# Patient Record
Sex: FEMALE | Race: BLACK OR AFRICAN AMERICAN | NOT HISPANIC OR LATINO | Employment: OTHER | URBAN - METROPOLITAN AREA
[De-identification: names, ages, dates, MRNs, and addresses within clinical notes are randomized per-mention and may not be internally consistent; named-entity substitution may affect disease eponyms.]

---

## 2023-08-07 ENCOUNTER — HOSPITAL ENCOUNTER (INPATIENT)
Facility: HOSPITAL | Age: 88
LOS: 2 days | Discharge: HOSPICE | DRG: 054 | End: 2023-08-09
Attending: EMERGENCY MEDICINE | Admitting: INTERNAL MEDICINE
Payer: MEDICARE

## 2023-08-07 DIAGNOSIS — I63.9 STROKE: ICD-10-CM

## 2023-08-07 DIAGNOSIS — I10 HYPERTENSION, UNSPECIFIED TYPE: ICD-10-CM

## 2023-08-07 DIAGNOSIS — I61.2 NONTRAUMATIC HEMORRHAGE OF RIGHT CEREBRAL HEMISPHERE: Primary | ICD-10-CM

## 2023-08-07 DIAGNOSIS — R90.0 INTRACRANIAL MASS: ICD-10-CM

## 2023-08-07 DIAGNOSIS — I61.1 NONTRAUMATIC CORTICAL HEMORRHAGE OF RIGHT CEREBRAL HEMISPHERE: ICD-10-CM

## 2023-08-07 DIAGNOSIS — I61.9 INTRACEREBRAL HEMORRHAGE: ICD-10-CM

## 2023-08-07 LAB
ALBUMIN SERPL-MCNC: 3.8 G/DL (ref 3.4–4.8)
ALBUMIN/GLOB SERPL: 1.5 RATIO (ref 1.1–2)
ALP SERPL-CCNC: 143 UNIT/L (ref 40–150)
ALT SERPL-CCNC: 11 UNIT/L (ref 0–55)
APTT PPP: 39.1 SECONDS (ref 23.2–33.7)
AST SERPL-CCNC: 19 UNIT/L (ref 5–34)
AV INDEX (PROSTH): 0.65
AV MEAN GRADIENT: 6 MMHG
AV PEAK GRADIENT: 10 MMHG
AV VALVE AREA BY VELOCITY RATIO: 2.19 CM²
AV VALVE AREA: 2.26 CM²
AV VELOCITY RATIO: 0.63
BASOPHILS # BLD AUTO: 0.01 X10(3)/MCL
BASOPHILS NFR BLD AUTO: 0.2 %
BILIRUBIN DIRECT+TOT PNL SERPL-MCNC: 1 MG/DL
BSA FOR ECHO PROCEDURE: 1.83 M2
BUN SERPL-MCNC: 12 MG/DL (ref 9.8–20.1)
CALCIUM SERPL-MCNC: 10.8 MG/DL (ref 8.4–10.2)
CHLORIDE SERPL-SCNC: 105 MMOL/L (ref 98–111)
CHOLEST SERPL-MCNC: 174 MG/DL
CHOLEST/HDLC SERPL: 3 {RATIO} (ref 0–5)
CO2 SERPL-SCNC: 30 MMOL/L (ref 23–31)
CREAT SERPL-MCNC: 0.91 MG/DL (ref 0.55–1.02)
CV ECHO LV RWT: 0.46 CM
DOP CALC AO PEAK VEL: 1.58 M/S
DOP CALC AO VTI: 36.3 CM
DOP CALC LVOT AREA: 3.5 CM2
DOP CALC LVOT DIAMETER: 2.1 CM
DOP CALC LVOT PEAK VEL: 1 M/S
DOP CALC LVOT STROKE VOLUME: 82.05 CM3
DOP CALC MV VTI: 37.9 CM
DOP CALCLVOT PEAK VEL VTI: 23.7 CM
E WAVE DECELERATION TIME: 232 MSEC
E/A RATIO: 0.88
E/E' RATIO: 18.67 M/S
ECHO LV POSTERIOR WALL: 1.11 CM (ref 0.6–1.1)
EOSINOPHIL # BLD AUTO: 0.04 X10(3)/MCL (ref 0–0.9)
EOSINOPHIL NFR BLD AUTO: 0.8 %
ERYTHROCYTE [DISTWIDTH] IN BLOOD BY AUTOMATED COUNT: 12.8 % (ref 11.5–17)
EST. AVERAGE GLUCOSE BLD GHB EST-MCNC: 111.2 MG/DL
FRACTIONAL SHORTENING: 30 % (ref 28–44)
GFR SERPLBLD CREATININE-BSD FMLA CKD-EPI: 59 MLS/MIN/1.73/M2
GLOBULIN SER-MCNC: 2.5 GM/DL (ref 2.4–3.5)
GLUCOSE SERPL-MCNC: 84 MG/DL (ref 75–121)
HBA1C MFR BLD: 5.5 %
HCT VFR BLD AUTO: 38 % (ref 37–47)
HDLC SERPL-MCNC: 51 MG/DL (ref 35–60)
HGB BLD-MCNC: 12.9 G/DL (ref 12–16)
IMM GRANULOCYTES # BLD AUTO: 0.01 X10(3)/MCL (ref 0–0.04)
IMM GRANULOCYTES NFR BLD AUTO: 0.2 %
INR PPP: 1.1
INTERVENTRICULAR SEPTUM: 0.82 CM (ref 0.6–1.1)
LDLC SERPL CALC-MCNC: 113 MG/DL (ref 50–140)
LEFT ATRIUM SIZE: 3 CM
LEFT ATRIUM VOLUME INDEX MOD: 50.1 ML/M2
LEFT ATRIUM VOLUME MOD: 90.7 CM3
LEFT INTERNAL DIMENSION IN SYSTOLE: 3.37 CM (ref 2.1–4)
LEFT VENTRICLE DIASTOLIC VOLUME INDEX: 59.12 ML/M2
LEFT VENTRICLE DIASTOLIC VOLUME: 107 ML
LEFT VENTRICLE MASS INDEX: 89 G/M2
LEFT VENTRICLE SYSTOLIC VOLUME INDEX: 25.6 ML/M2
LEFT VENTRICLE SYSTOLIC VOLUME: 46.4 ML
LEFT VENTRICULAR INTERNAL DIMENSION IN DIASTOLE: 4.79 CM (ref 3.5–6)
LEFT VENTRICULAR MASS: 161.64 G
LV LATERAL E/E' RATIO: 16.8 M/S
LV SEPTAL E/E' RATIO: 21 M/S
LVOT MG: 2 MMHG
LVOT MV: 0.64 CM/S
LYMPHOCYTES # BLD AUTO: 1.08 X10(3)/MCL (ref 0.6–4.6)
LYMPHOCYTES NFR BLD AUTO: 22.7 %
MCH RBC QN AUTO: 31.1 PG (ref 27–31)
MCHC RBC AUTO-ENTMCNC: 33.9 G/DL (ref 33–36)
MCV RBC AUTO: 91.6 FL (ref 80–94)
MONOCYTES # BLD AUTO: 0.42 X10(3)/MCL (ref 0.1–1.3)
MONOCYTES NFR BLD AUTO: 8.8 %
MV MEAN GRADIENT: 2 MMHG
MV PEAK A VEL: 0.96 M/S
MV PEAK E VEL: 0.84 M/S
MV PEAK GRADIENT: 5 MMHG
MV STENOSIS PRESSURE HALF TIME: 96 MS
MV VALVE AREA BY CONTINUITY EQUATION: 2.16 CM2
MV VALVE AREA P 1/2 METHOD: 2.29 CM2
NEUTROPHILS # BLD AUTO: 3.19 X10(3)/MCL (ref 2.1–9.2)
NEUTROPHILS NFR BLD AUTO: 67.3 %
NRBC BLD AUTO-RTO: 0 %
OHS LV EJECTION FRACTION SIMPSONS BIPLANE MOD: 6 %
PISA TR MAX VEL: 2.5 M/S
PLATELET # BLD AUTO: 190 X10(3)/MCL (ref 130–400)
PMV BLD AUTO: 9.9 FL (ref 7.4–10.4)
POTASSIUM SERPL-SCNC: 3.7 MMOL/L (ref 3.5–5.1)
PROT SERPL-MCNC: 6.3 GM/DL (ref 5.8–7.6)
PROTHROMBIN TIME: 14.1 SECONDS (ref 12.5–14.5)
PV PEAK GRADIENT: 5 MMHG
PV PEAK VELOCITY: 1.13 M/S
RA PRESSURE ESTIMATED: 3 MMHG
RBC # BLD AUTO: 4.15 X10(6)/MCL (ref 4.2–5.4)
RV TB RVSP: 6 MMHG
SODIUM SERPL-SCNC: 142 MMOL/L (ref 132–146)
TDI LATERAL: 0.05 M/S
TDI SEPTAL: 0.04 M/S
TDI: 0.05 M/S
TR MAX PG: 25 MMHG
TRICUSPID ANNULAR PLANE SYSTOLIC EXCURSION: 1.95 CM
TRIGL SERPL-MCNC: 48 MG/DL (ref 37–140)
TSH SERPL-ACNC: 1.75 UIU/ML (ref 0.35–4.94)
TV REST PULMONARY ARTERY PRESSURE: 28 MMHG
VLDLC SERPL CALC-MCNC: 10 MG/DL
WBC # SPEC AUTO: 4.75 X10(3)/MCL (ref 4.5–11.5)
Z-SCORE OF LEFT VENTRICULAR DIMENSION IN END DIASTOLE: -0.44
Z-SCORE OF LEFT VENTRICULAR DIMENSION IN END SYSTOLE: 0.68

## 2023-08-07 PROCEDURE — 25000003 PHARM REV CODE 250

## 2023-08-07 PROCEDURE — 84443 ASSAY THYROID STIM HORMONE: CPT | Performed by: NURSE PRACTITIONER

## 2023-08-07 PROCEDURE — 99222 PR INITIAL HOSPITAL CARE,LEVL II: ICD-10-PCS | Mod: ,,, | Performed by: SPECIALIST

## 2023-08-07 PROCEDURE — 99291 CRITICAL CARE FIRST HOUR: CPT

## 2023-08-07 PROCEDURE — 20000000 HC ICU ROOM

## 2023-08-07 PROCEDURE — 63600175 PHARM REV CODE 636 W HCPCS

## 2023-08-07 PROCEDURE — 99222 1ST HOSP IP/OBS MODERATE 55: CPT | Mod: ,,, | Performed by: SPECIALIST

## 2023-08-07 PROCEDURE — 85025 COMPLETE CBC W/AUTO DIFF WBC: CPT | Performed by: EMERGENCY MEDICINE

## 2023-08-07 PROCEDURE — 63600175 PHARM REV CODE 636 W HCPCS: Performed by: SPECIALIST

## 2023-08-07 PROCEDURE — 63600175 PHARM REV CODE 636 W HCPCS: Performed by: NURSE PRACTITIONER

## 2023-08-07 PROCEDURE — 85730 THROMBOPLASTIN TIME PARTIAL: CPT | Performed by: EMERGENCY MEDICINE

## 2023-08-07 PROCEDURE — 96374 THER/PROPH/DIAG INJ IV PUSH: CPT

## 2023-08-07 PROCEDURE — 80053 COMPREHEN METABOLIC PANEL: CPT | Performed by: EMERGENCY MEDICINE

## 2023-08-07 PROCEDURE — 83036 HEMOGLOBIN GLYCOSYLATED A1C: CPT | Performed by: NURSE PRACTITIONER

## 2023-08-07 PROCEDURE — 85610 PROTHROMBIN TIME: CPT | Performed by: EMERGENCY MEDICINE

## 2023-08-07 PROCEDURE — 80061 LIPID PANEL: CPT | Performed by: NURSE PRACTITIONER

## 2023-08-07 PROCEDURE — 25000003 PHARM REV CODE 250: Performed by: INTERNAL MEDICINE

## 2023-08-07 RX ORDER — HYDRALAZINE HYDROCHLORIDE 20 MG/ML
10 INJECTION INTRAMUSCULAR; INTRAVENOUS EVERY 4 HOURS PRN
Status: DISCONTINUED | OUTPATIENT
Start: 2023-08-07 | End: 2023-08-07

## 2023-08-07 RX ORDER — LABETALOL HYDROCHLORIDE 5 MG/ML
10 INJECTION, SOLUTION INTRAVENOUS
Status: DISCONTINUED | OUTPATIENT
Start: 2023-08-07 | End: 2023-08-08

## 2023-08-07 RX ORDER — MECLIZINE HYDROCHLORIDE 50 MG/1
25 TABLET ORAL 3 TIMES DAILY PRN
COMMUNITY

## 2023-08-07 RX ORDER — HYDRALAZINE HYDROCHLORIDE 20 MG/ML
10 INJECTION INTRAMUSCULAR; INTRAVENOUS EVERY 4 HOURS PRN
Status: DISCONTINUED | OUTPATIENT
Start: 2023-08-07 | End: 2023-08-09 | Stop reason: HOSPADM

## 2023-08-07 RX ORDER — HYDRALAZINE HYDROCHLORIDE 20 MG/ML
10 INJECTION INTRAMUSCULAR; INTRAVENOUS EVERY 6 HOURS PRN
Status: DISCONTINUED | OUTPATIENT
Start: 2023-08-07 | End: 2023-08-07

## 2023-08-07 RX ORDER — LABETALOL HYDROCHLORIDE 5 MG/ML
10 INJECTION, SOLUTION INTRAVENOUS
Status: DISCONTINUED | OUTPATIENT
Start: 2023-08-07 | End: 2023-08-07

## 2023-08-07 RX ORDER — MULTIVITAMIN
1 TABLET ORAL DAILY
COMMUNITY

## 2023-08-07 RX ORDER — LEVOCETIRIZINE DIHYDROCHLORIDE 5 MG/1
5 TABLET, FILM COATED ORAL NIGHTLY
COMMUNITY

## 2023-08-07 RX ORDER — LABETALOL HYDROCHLORIDE 5 MG/ML
10 INJECTION, SOLUTION INTRAVENOUS EVERY 6 HOURS PRN
Status: DISCONTINUED | OUTPATIENT
Start: 2023-08-07 | End: 2023-08-09 | Stop reason: HOSPADM

## 2023-08-07 RX ORDER — SODIUM CHLORIDE 0.9 % (FLUSH) 0.9 %
10 SYRINGE (ML) INJECTION
Status: DISCONTINUED | OUTPATIENT
Start: 2023-08-07 | End: 2023-08-09 | Stop reason: HOSPADM

## 2023-08-07 RX ORDER — HYDRALAZINE HYDROCHLORIDE 20 MG/ML
INJECTION INTRAMUSCULAR; INTRAVENOUS
Status: DISPENSED
Start: 2023-08-07 | End: 2023-08-08

## 2023-08-07 RX ORDER — DILTIAZEM HYDROCHLORIDE 360 MG/1
360 CAPSULE, EXTENDED RELEASE ORAL DAILY
COMMUNITY

## 2023-08-07 RX ORDER — MUPIROCIN 20 MG/G
OINTMENT TOPICAL 2 TIMES DAILY
Status: DISCONTINUED | OUTPATIENT
Start: 2023-08-07 | End: 2023-08-09 | Stop reason: HOSPADM

## 2023-08-07 RX ORDER — LEVETIRACETAM 500 MG/1
500 TABLET ORAL 2 TIMES DAILY
Status: DISCONTINUED | OUTPATIENT
Start: 2023-08-07 | End: 2023-08-09 | Stop reason: HOSPADM

## 2023-08-07 RX ORDER — PRAVASTATIN SODIUM 40 MG/1
40 TABLET ORAL DAILY
COMMUNITY

## 2023-08-07 RX ADMIN — HYDRALAZINE HYDROCHLORIDE 10 MG: 20 INJECTION INTRAMUSCULAR; INTRAVENOUS at 04:08

## 2023-08-07 RX ADMIN — LABETALOL HYDROCHLORIDE 10 MG: 5 INJECTION, SOLUTION INTRAVENOUS at 02:08

## 2023-08-07 RX ADMIN — LABETALOL HYDROCHLORIDE 10 MG: 5 INJECTION, SOLUTION INTRAVENOUS at 08:08

## 2023-08-07 RX ADMIN — LEVETIRACETAM 500 MG: 500 TABLET, FILM COATED ORAL at 09:08

## 2023-08-07 RX ADMIN — MUPIROCIN: 20 OINTMENT TOPICAL at 09:08

## 2023-08-07 NOTE — H&P
[facility] - Emergency Dept  Pulmonary Critical Care Note    Patient Name: Kristel Mayes  MRN: [id number]  Admission Date: 8/7/2023  Hospital Length of Stay: 0 days  Code Status: No Order  Attending Provider: Xavier Johns MD  Primary Care Provider: No primary care provider on file.     Subjective:     HPI:   89-year-old female with past medical history of HTN and A fib on Eliqiuis presented to [facility] ER as a transfer from [facility] after presenting with left-sided weakness and left sided facial droop that began about 2-3 days prior accompanied by generalized weakness and dizziness. Patient and patient's family denies any fall or injury.   At [facility], patient was found to have a right frontoparietal intracranial hemorrhage with mild midline shift on head CT. Per report from the ER, she was given K centra prior to transfer for reversal of anticoagulation and had an NIH of 1.    In the ER, patient was seen by neurology who ordered head CT and started on labetalol for strict BP control. BP was 138/88 with a HR of 62 and RR of 17. She was afebrile and saturating 95% on room air.    Hospital Course/Significant events:  As above    24 Hour Interval History:  As above    No past medical history on file.  A fib  Hypertension    No past surgical history on file.  I&D last year for perirectal abscess    Social History     Socioeconomic History    Marital status:            Current Outpatient Medications   Medication Instructions    apixaban (ELIQUIS) 2.5 mg Tab Oral, 2 times daily    diltiaZEM (CARDIZEM CD) 360 mg, Oral, Daily    levocetirizine (XYZAL) 5 mg, Oral, Nightly    meclizine (ANTIVERT) 25 mg, Oral, 3 times daily PRN    multivitamin (THERAGRAN) per tablet 1 tablet, Oral, Daily    pravastatin (PRAVACHOL) 40 mg, Oral, Daily       Current Inpatient Medications      Current Intravenous Infusions        Review of Systems   Constitutional:  Negative for chills and  fever.   HENT:  Negative for congestion.    Eyes:  Negative for pain.   Respiratory:  Negative for cough.    Cardiovascular:  Negative for chest pain.   Gastrointestinal:  Negative for abdominal pain, constipation, diarrhea, nausea and vomiting.   Genitourinary:  Negative for dysuria.   Musculoskeletal:  Negative for falls.   Neurological:  Negative for tingling, focal weakness, seizures, loss of consciousness, weakness and headaches.          Objective:     No intake or output data in the 24 hours ending 08/07/23 1454      Vital Signs (Most Recent):  Temp: 98.8 °F (37.1 °C) (08/07/23 1350)  Pulse: 83 (08/07/23 1409)  Resp: (!) 21 (08/07/23 1409)  BP: (!) 151/78 (08/07/23 1409)  SpO2: 97 % (08/07/23 1409)  Body mass index is 25.62 kg/m².  Weight: 72 kg (158 lb 11.7 oz) Vital Signs (24h Range):  Temp:  [98.8 °F (37.1 °C)] 98.8 °F (37.1 °C)  Pulse:  [80-83] 83  Resp:  [21-22] 21  SpO2:  [97 %] 97 %  BP: (151-157)/(78-92) 151/78     Physical Exam  Constitutional:       General: She is not in acute distress.     Comments: Answering questions appropriately and cooperative with exam.   HENT:      Mouth/Throat:      Mouth: Mucous membranes are moist.   Eyes:      Extraocular Movements: Extraocular movements intact.      Pupils: Pupils are equal, round, and reactive to light.   Cardiovascular:      Rate and Rhythm: Normal rate and regular rhythm.      Heart sounds: Normal heart sounds. No murmur heard.     Comments: 2+ pedal and radial pulses B/L  Pulmonary:      Effort: Pulmonary effort is normal. No respiratory distress.      Breath sounds: Normal breath sounds. No wheezing.      Comments: Breathing comfortably on RA  Abdominal:      General: Abdomen is flat. There is no distension.      Palpations: Abdomen is soft. There is no mass.      Tenderness: There is no abdominal tenderness. There is no right CVA tenderness, left CVA tenderness, guarding or rebound.   Musculoskeletal:         General: Swelling (B/L lower  "extreities) present.      Right lower leg: No edema.      Left lower leg: No edema.      Comments: Able to lift B/L legs and arms off bed against resistance.  5/5  strength B/L   Skin:     General: Skin is warm.      Capillary Refill: Capillary refill takes less than 2 seconds.   Neurological:      Mental Status: She is alert and oriented to person, place, and time.      Comments: Sensation to light touch intact bilaterally. No facial droop noted           Lines/Drains/Airways       Peripheral Intravenous Line  Duration                  Peripheral IV - Single Lumen 08/07/23 1402 22 G Right Antecubital <1 day         Peripheral IV - Single Lumen 08/07/23 Left Antecubital <1 day                    Significant Labs:    Lab Results   Component Value Date    WBC 4.75 08/07/2023    HGB 12.9 08/07/2023    HCT 38.0 08/07/2023    MCV 91.6 08/07/2023     08/07/2023           BMP  Lab Results   Component Value Date     08/07/2023    K 3.7 08/07/2023    CHLORIDE 105 08/07/2023    CO2 30 08/07/2023    BUN 12.0 08/07/2023    CREATININE 0.91 08/07/2023    CALCIUM 10.8 (H) 08/07/2023         ABG  No results for input(s): "PH", "PO2", "PCO2", "HCO3", "POCBASEDEF" in the last 168 hours.    Mechanical Ventilation Support: none         Significant Imaging:  I have reviewed the pertinent imaging within the past 24 hours.        Assessment/Plan:     Assessment  Right frontoparietal intracranial hemorrhage with mild midline shift  Hypertension  Atrial fibrillation    Plan  Maintain blood pressure less than 130-150/90.  MRI brain final read pending  Q1 hr neuro checks; will avoid antiplatelet and anticoagulation at this time and elevate the head of the bed per Neurology recs  PT/OT/ST to evaluate patient.  Repeat CT head in the morning  Keppra for seizure prophylaxis   Labetalol and hydralazine for blood pressure control.  We will hold home diltiazem 360 mg at this time.   Neurology and Neurosurgery have been consulted; " all recs are appreciated.          DVT Prophylaxis: SCDs  GI Prophylaxis: none           32 minutes of critical care was time spent personally by me on the following activities: development of treatment plan with patient or surrogate and bedside caregivers, discussions with consultants, evaluation of patient's response to treatment, examination of patient, ordering and performing treatments and interventions, ordering and review of laboratory studies, ordering and review of radiographic studies, pulse oximetry, re-evaluation of patient's condition.  This critical care time did not overlap with that of any other provider or involve time for any procedures.           Shannan Ac, DO  Pulmonary Critical Care Medicine  [facility] - Emergency Dept

## 2023-08-07 NOTE — H&P
I saw patient shortly after her arrival in the ICU.  Family at the bedside.  Patient with several days of weakness specifically left-sided in nature.  CT frontoparietal intracranial hemorrhage ICU for blood pressure control and frequent neuro checks.  Patient taking Eliquis at home for chronic atrial fib Kcentra given in the seen by Neurosurgery and Neurology whose help is greatly appreciated.  Resident admit note is pending.

## 2023-08-07 NOTE — CONSULTS
[facility] - 7th Floor ICU  Neurology  Consult Note    Patient Name: Kristel Mayes  MRN: [id number]  Admission Date: 8/7/2023  Hospital Length of Stay: 0 days  Code Status: Full Code   Attending Provider: Xavier Johns MD   Consulting Provider: Kalani Torres NP  Primary Care Physician: No, Primary Doctor  Principal Problem:<principal problem not specified>    Inpatient consult to Vascular (Stroke) Neurology  Consult performed by: Kalani Torres NP  Consult ordered by: Shawnee Guidry MD         Subjective:     Chief Complaint:  Left sided weakness     HPI:   89 year old female of HLD (on Eliquis 2.5 mg) presented to [facility] ED on 8/7 as a transfer from [facility] for right intracranial hemorrhage. She presented to [facility] for left sided weakness and left facial droop x 2-3 days. She was found to have a right frontoparietal hemorrhage with midline shift. She was given K centra prior to transfer. Upon arrival to ED, /92. Neurology was consulted for hemorrhagic stroke.      No past medical history on file.    No past surgical history on file.    Review of patient's allergies indicates:  No Known Allergies    Current Neurological Medications:     No current facility-administered medications on file prior to encounter.     Current Outpatient Medications on File Prior to Encounter   Medication Sig    apixaban (ELIQUIS) 2.5 mg Tab Take by mouth 2 (two) times daily.    diltiaZEM (CARDIZEM CD) 360 MG 24 hr capsule Take 360 mg by mouth once daily.    levocetirizine (XYZAL) 5 MG tablet Take 5 mg by mouth every evening.    meclizine (ANTIVERT) 50 MG tablet Take 25 mg by mouth 3 (three) times daily as needed.    multivitamin (THERAGRAN) per tablet Take 1 tablet by mouth once daily.    pravastatin (PRAVACHOL) 40 MG tablet Take 40 mg by mouth once daily.     Family History    None       Tobacco Use    Smoking status: Not on file    Smokeless tobacco: Not on file   Substance and Sexual Activity     Alcohol use: Not on file    Drug use: Not on file    Sexual activity: Not on file     Review of Systems   Unable to perform ROS: Acuity of condition     Objective:     Vital Signs (Most Recent):  Temp: 98.8 °F (37.1 °C) (08/07/23 1350)  Pulse: 64 (08/07/23 1600)  Resp: (!) 21 (08/07/23 1600)  BP: 139/76 (08/07/23 1600)  SpO2: 95 % (08/07/23 1600) Vital Signs (24h Range):  Temp:  [98.8 °F (37.1 °C)] 98.8 °F (37.1 °C)  Pulse:  [62-89] 64  Resp:  [16-22] 21  SpO2:  [95 %-98 %] 95 %  BP: (131-165)/(48-92) 139/76     Weight: 72 kg (158 lb 11.7 oz)  Body mass index is 25.62 kg/m².     Physical Exam  Vitals and nursing note reviewed.   Constitutional:       General: She is not in acute distress.     Appearance: She is not ill-appearing or toxic-appearing.   HENT:      Head: Normocephalic.   Eyes:      General: No visual field deficit.     Pupils: Pupils are equal, round, and reactive to light.   Pulmonary:      Effort: Pulmonary effort is normal.   Musculoskeletal:         General: Normal range of motion.      Cervical back: Normal range of motion.      Right lower leg: No edema.      Left lower leg: No edema.   Skin:     General: Skin is warm and dry.      Capillary Refill: Capillary refill takes less than 2 seconds.   Neurological:      Mental Status: She is alert.      Cranial Nerves: No dysarthria or facial asymmetry.      Sensory: No sensory deficit.      Motor: Weakness (BLE weakness) present. No tremor, atrophy, abnormal muscle tone, seizure activity or pronator drift.      Comments:     Unsure if she is confused, she seemed to know she was at the hospital and that her weakness happened a couple of days ago, she is unsure if she is on medications or what type of medical problems she has   Psychiatric:         Attention and Perception: Attention normal.         Behavior: Behavior is cooperative.          NEUROLOGICAL EXAMINATION:     CRANIAL NERVES     CN III, IV, VI   Pupils are equal, round, and reactive to  light.      Significant Labs:   Recent Lab Results         08/07/23  1402        Albumin/Globulin Ratio 1.5       Albumin 3.8       Alkaline Phosphatase 143       ALT 11       aPTT 39.1  Comment: For Minimal Heparin Infusion, the goal aPTT 64-85 seconds corresponds to an anti-Xa of 0.3-0.5.    For Low Intensity and High Intensity Heparin, the goal aPTT  seconds corresponds to an anti-Xa of 0.3-0.7       AST 19       Baso # 0.01       Basophil % 0.2       BILIRUBIN TOTAL 1.0       BUN 12.0       Calcium 10.8       Chloride 105       CO2 30       Creatinine 0.91       eGFR 59       Eos # 0.04       Eosinophil % 0.8       Globulin, Total 2.5       Glucose 84       Hematocrit 38.0       Hemoglobin 12.9       Immature Grans (Abs) 0.01       Immature Granulocytes 0.2       INR 1.1       Lymph # 1.08       LYMPH % 22.7       MCH 31.1       MCHC 33.9       MCV 91.6       Mono # 0.42       Mono % 8.8       MPV 9.9       Neut # 3.19       Neut % 67.3       nRBC 0.0       Platelets 190       Potassium 3.7       PROTEIN TOTAL 6.3       Protime 14.1       RBC 4.15       RDW 12.8       Sodium 142       WBC 4.75               Significant Imaging: I have reviewed all pertinent imaging results/findings within the past 24 hours.    Assessment and Plan:     Hemorrhagic stroke  Hemorrhagic stroke-transfer from [facility]      -hourly neuro checks ... notify neurology of any neuro change  -strict blood pressure control ... BP goal 130-150 systolic   -avoid antiplatelet or anticoagulation at this time  -seizure precautions ... notify neurology of any seizure-like activity  -needs MRI brain   -therapy eval   -elevate HOB      VTE Risk Mitigation (From admission, onward)         Ordered     Reason for No Pharmacological VTE Prophylaxis  Once        Question:  Reasons:  Answer:  Risk of Bleeding    08/07/23 1613     IP VTE HIGH RISK PATIENT  Once         08/07/23 1613     Place sequential compression device  Until discontinued          08/07/23 5073                Thank you for your consult. Further recommendations to follow from MD Kalani Torres, NP  Neurology  [facility] - 7th Floor ICU

## 2023-08-07 NOTE — HPI
89 year old female of HLD (on Eliquis 2.5 mg) presented to [facility] ED on 8/7 as a transfer from [facility] for right intracranial hemorrhage. She presented to [facility] for left sided weakness and left facial droop x 2-3 days. She was found to have a right frontoparietal hemorrhage with midline shift. She was given K centra prior to transfer. Upon arrival to ED, /92. Neurology was consulted for hemorrhagic stroke.

## 2023-08-07 NOTE — CONSULTS
[facility] - Emergency Dept  Neurosurgery  Consult Note    Inpatient consult to Neurosurgery  Consult performed by: Javi Shen PA  Consult ordered by: Shawnee Guidry MD        Subjective:     Chief Complaint/Reason for Admission: Intracranial hemorrhage    History of Present Illness: 89 year old female on Eliquis transferred to [facility] from [facility] for evaluation after CT head revealed right intracranial hemorrhage. Family states patient began with left sided extremity weakness two days ago. She called her primary care who recommended the patient go to the ED. Patient also complains of generalized weakness and dizziness. Family denies any fall or injury. Family also notes a left sided facial droop. Patient was given K centra prior to transfer.     (Not in a hospital admission)      Review of patient's allergies indicates:  No Known Allergies    No past medical history on file.  No past surgical history on file.  Family History    None       Tobacco Use    Smoking status: Not on file    Smokeless tobacco: Not on file   Substance and Sexual Activity    Alcohol use: Not on file    Drug use: Not on file    Sexual activity: Not on file       Objective:     Weight: 72 kg (158 lb 11.7 oz)  Body mass index is 25.62 kg/m².  Vital Signs (Most Recent):  Temp: 98.8 °F (37.1 °C) (08/07/23 1350)  Pulse: 62 (08/07/23 1503)  Resp: 17 (08/07/23 1503)  BP: 138/88 (08/07/23 1503)  SpO2: 95 % (08/07/23 1503) Vital Signs (24h Range):  Temp:  [98.8 °F (37.1 °C)] 98.8 °F (37.1 °C)  Pulse:  [62-89] 62  Resp:  [17-22] 17  SpO2:  [95 %-97 %] 95 %  BP: (138-165)/(71-92) 138/88                              Neurosurgery Physical Exam  Awake, alert, oriented to person, place, and time.   4/5 motors in all extremities, slightly weaker in the left than right upper and lower. Sensation intact  EOMI. PERRLA.   Left sided facial droop.   Speech normal. Non slurred.   Thought process normal.     Significant  Labs:  Recent Labs   Lab 08/07/23  1402      K 3.7   CO2 30   BUN 12.0   CREATININE 0.91   CALCIUM 10.8*     Recent Labs   Lab 08/07/23  1402   WBC 4.75   HGB 12.9   HCT 38.0        Recent Labs   Lab 08/07/23  1402   INR 1.1     Microbiology Results (last 7 days)       ** No results found for the last 168 hours. **              Assessment/Plan:     -CT head with a right frontoparietal intracranial hemorrhage with mild midline shift  -Kcentra given for eliquis reversal  -Maintain bp less than 130/90  -Neurology consulted  -MRI brain pending  -Repeat CT head in AM.   -Keppra for seizure prophylaxis.     Thank you for your consult. I will follow-up with patient. Please contact us if you have any additional questions.    AKIKO Beth  Neurosurgery  [facility] Dept

## 2023-08-07 NOTE — PLAN OF CARE
Problem: Adult Inpatient Plan of Care  Goal: Plan of Care Review  Outcome: Ongoing, Progressing  Goal: Patient-Specific Goal (Individualized)  Outcome: Ongoing, Progressing  Goal: Absence of Hospital-Acquired Illness or Injury  Outcome: Ongoing, Progressing  Goal: Optimal Comfort and Wellbeing  Outcome: Ongoing, Progressing  Goal: Readiness for Transition of Care  Outcome: Ongoing, Progressing     Problem: Adjustment to Illness (Stroke, Hemorrhagic)  Goal: Optimal Coping  Outcome: Ongoing, Progressing     Problem: Bowel Elimination Impaired (Stroke, Hemorrhagic)  Goal: Effective Bowel Elimination  Outcome: Ongoing, Progressing     Problem: Cerebral Tissue Perfusion (Stroke, Hemorrhagic)  Goal: Optimal Cerebral Tissue Perfusion  Outcome: Ongoing, Progressing     Problem: Cognitive Impairment (Stroke, Hemorrhagic)  Goal: Optimal Cognitive Function  Outcome: Ongoing, Progressing     Problem: Communication Impairment (Stroke, Hemorrhagic)  Goal: Effective Communication Skills  Outcome: Ongoing, Progressing     Problem: Functional Ability Impaired (Stroke, Hemorrhagic)  Goal: Optimal Functional Ability  Outcome: Ongoing, Progressing     Problem: Pain (Stroke, Hemorrhagic)  Goal: Acceptable Pain Control  Outcome: Ongoing, Progressing     Problem: Respiratory Compromise (Stroke, Hemorrhagic)  Goal: Effective Oxygenation and Ventilation  Outcome: Ongoing, Progressing     Problem: Sensorimotor Impairment (Stroke, Hemorrhagic)  Goal: Improved Sensorimotor Function  Outcome: Ongoing, Progressing     Problem: Urinary Elimination Impaired (Stroke, Hemorrhagic)  Goal: Effective Urinary Elimination  Outcome: Ongoing, Progressing     Problem: Fall Injury Risk  Goal: Absence of Fall and Fall-Related Injury  Outcome: Ongoing, Progressing     Problem: Skin Injury Risk Increased  Goal: Skin Health and Integrity  Outcome: Ongoing, Progressing

## 2023-08-07 NOTE — ED PROVIDER NOTES
Encounter Date: 8/7/2023       History     Chief Complaint   Patient presents with    Cerebrovascular Accident     CVA from [place] sided weakness since Saturday. No falls. Increased dizziness and weakness.   Kcentra given prior to transport. Dx c R frontal/parietal ICH c midline shift      89-year-old female presents to the emergency department as transfer from [facility] for neuro evaluation after presenting there with left-sided weakness per family for the last several days, generalized weakness and dizziness.  No reported fall or injury.  She presented there relatively normotensive, NIH of 1, euglycemic, CT head with a right frontoparietal intracranial hemorrhage with mild midline shift, per Radiology report hemorrhagic mass with surrounding vasogenic edema versus hemorrhagic transformation of an infarct. She was given K centra prior to transfer for reversal of anticoagulation.     The history is provided by the patient, medical records and the EMS personnel.   Cerebrovascular Accident  The primary symptoms include focal weakness. Primary symptoms do not include fever. The symptoms began several days ago. The symptoms are waxing and waning. The neurological symptoms are focal.   Additional symptoms include weakness.     Review of patient's allergies indicates:  No Known Allergies  No past medical history on file.  No past surgical history on file.  No family history on file.     Review of Systems   Constitutional:  Negative for fever.   Respiratory:  Negative for chest tightness and shortness of breath.    Neurological:  Positive for focal weakness and weakness.       Physical Exam     Initial Vitals [08/07/23 1350]   BP Pulse Resp Temp SpO2   (!) 157/92 80 (!) 22 98.8 °F (37.1 °C) 97 %      MAP       --         Physical Exam    Nursing note and vitals reviewed.  Constitutional: She appears well-developed and well-nourished. No distress.   HENT:   Head: Normocephalic and atraumatic.   Mouth/Throat:  Oropharynx is clear and moist.   Eyes: Conjunctivae are normal.   Neck: Neck supple.   Normal range of motion.  Cardiovascular:  Normal rate, regular rhythm and normal heart sounds.           Pulmonary/Chest: Breath sounds normal. No respiratory distress.   Musculoskeletal:         General: No edema. Normal range of motion.      Cervical back: Normal range of motion and neck supple.     Neurological: She is alert and oriented to person, place, and time. She has normal strength. No cranial nerve deficit. GCS score is 15. GCS eye subscore is 4. GCS verbal subscore is 5. GCS motor subscore is 6.   Skin: Skin is warm and dry.         ED Course   Critical Care    Date/Time: 8/7/2023 2:19 PM    Performed by: Shawnee Guidry MD  Authorized by: Shawnee Gudiry MD  Direct patient critical care time: 17 minutes  Additional history critical care time: 5 minutes  Ordering / reviewing critical care time: 4 minutes  Documentation critical care time: 3 minutes  Consulting other physicians critical care time: 7 minutes  Total critical care time (exclusive of procedural time) : 36 minutes  Critical care time was exclusive of separately billable procedures and treating other patients.  Critical care was necessary to treat or prevent imminent or life-threatening deterioration of the following conditions: CNS failure or compromise.  Critical care was time spent personally by me on the following activities: development of treatment plan with patient or surrogate, discussions with consultants, interpretation of cardiac output measurements, evaluation of patient's response to treatment, examination of patient, obtaining history from patient or surrogate, ordering and performing treatments and interventions, ordering and review of laboratory studies, ordering and review of radiographic studies, pulse oximetry and re-evaluation of patient's condition.        Labs Reviewed   COMPREHENSIVE METABOLIC PANEL - Abnormal; Notable for the  following components:       Result Value    Calcium Level Total 10.8 (*)     All other components within normal limits   APTT - Abnormal; Notable for the following components:    PTT 39.1 (*)     All other components within normal limits   CBC WITH DIFFERENTIAL - Abnormal; Notable for the following components:    RBC 4.15 (*)     MCH 31.1 (*)     All other components within normal limits   PROTIME-INR - Normal   CBC W/ AUTO DIFFERENTIAL    Narrative:     The following orders were created for panel order CBC auto differential.  Procedure                               Abnormality         Status                     ---------                               -----------         ------                     CBC with Differential[[id number]]        Abnormal            Final result                 Please view results for these tests on the individual orders.          Imaging Results              MRI Brain W WO Contrast (Final result)  Result time 08/08/23 13:47:14      Final result by Sri Estrada MD (08/08/23 13:47:14)                   Impression:      Large hemorrhagic right frontoparietal lobe mass with smaller right temporal lobe mass and significant surrounding edema, concerning for metastatic disease.      Electronically signed by: Sri Estrada  Date:    08/08/2023  Time:    13:47               Narrative:    EXAMINATION:  MRI BRAIN W WO CONTRAST    CLINICAL HISTORY:  Stroke, follow up;    TECHNIQUE:  Multiplanar, multisequence MR images of the brain were obtained with and without administration of intravenous contrast.    COMPARISON:  CT head dated 08/08/2023    FINDINGS:  There is a hemorrhagic mass in the right frontoparietal lobe, overall measuring 3.8 x 4.1 x 3.4 cm in size, and solid component measuring 2.6 x 3.8 cm in size.  An additional rim enhancing mass in the right temporal lobe measures 1.3 x 1.4 cm (series 10, image 18).  There is surrounding edema throughout the right posterior cerebral  hemisphere and temporal lobe.  There is thin dural enhancement along the right cerebral convexity.  Moderate additional patchy T2/FLAIR hyperintensities in the supratentorial white matter likely represent chronic microvascular ischemic changes.    There is mass effect with partial effacement the right lateral ventricle and 5 mm of leftward midline shift.  The basal cisterns are patent.  The major intracranial flow voids are patent.  The paranasal sinuses are clear.                                       Medications   labetaloL injection 10 mg (10 mg Intravenous Given 8/7/23 1440)         Medical Decision Making  Problems Addressed:  Hypertension, unspecified type: chronic illness or injury with exacerbation, progression, or side effects of treatment  Intracerebral hemorrhage: acute illness or injury that poses a threat to life or bodily functions  Intracranial mass: undiagnosed new problem with uncertain prognosis  Nontraumatic hemorrhage of right cerebral hemisphere: acute illness or injury that poses a threat to life or bodily functions    Amount and/or Complexity of Data Reviewed  Labs: ordered.    Risk  Decision regarding hospitalization.      ED assessment:    Ms. Mayes was transferred here for higher level of care, neurology/neurosurgery services after diagnosed intracerebral hemorrhage possible mass lesion upon presentation to an outside hospital for left-sided weakness for the last couple of days.  No significant appreciable weakness on examination, patient mildly hypertensive, resting comfortably.    Differential diagnosis (including but not limited to):   Ischemic stroke with hemorrhagic conversion, interest primary physician hemorrhage, metastatic malignancy with intracerebral lesion.  Hypertension, hypertensive crisis    ED management:   Transfer records reviewed insert radiology reports and hospital imaging.  Case discussed with neurology on-call who evaluated the patient at the bedside, agree with MRI  with and without contrast BP regulation 2 near normotensive levels.  Case discussed with neurosurgery as well who will follow-up the MRI results and offer further recommendations once additional imaging available.  Recommended ICU admission for close neuro monitoring and BP control in the interim.    Amount and/or Complexity of Data Reviewed  Independent historian: EMS   Summary of history:  EMS reported presented with left-sided weakness however has not waxing and waning in severity, CT of the outside facility with intracerebral hemorrhage  External data reviewed: transfer records, prior labs, and prior imaging  Summary of data reviewed:     CT head without IV contrast:    There is relatively large frontoparietal bleed on the right with surrounding white matter edema, although the adjacent cerebral cortex appears well-maintained with no gross cerebral cortical edema.  Intra-axial cerebral hematoma measures roughly 5 by 3.5 x 2 cm on the right.  There is associated mild mass effect on the right with mild midline shift from right to left.  Midline shift measures about 5 mm.  There is mild effacement of the right lateral ventricle.  There is no hydrocephalus.  There is no evidence of bleed elsewhere.  No evidence of infarct or masses elsewhere.  There is mild cerebral atrophy with suspected mild chronic small-vessel ischemic changes in the cerebral white matter.  The brainstem and cerebellum are grossly unremarkable.  Carotid siphon calcifications and distal vertebral artery calcifications are noted.  The bones are intact.  The paranasal sinuses are well aerated.  There is no evidence of significant middle ear disease or mastoid disease.  Some tiny mastoid effusions are suggested.    Chest x-ray:  Cardiac silhouette is mildly prominent stable no evidence of acute infiltrate.  There is chronic elevation of the left hemidiaphragm with chronic atelectasis within the left lung base.  There is dense opacity again seen  overlying the left upper chest likely representing pleural calcifications.  There pleural calcifications within the right lung base.  No evidence of any pleural effusions or pulmonary edema.  There atherosclerotic changes of the aorta    WBC 4.3, hemoglobin 12.5, hematocrit 3 8.6, platelets 178 troponin 0.05, sodium 144, potassium 4.8, chloride 106, CO2 23, BUN 11.9, creatinine 0.89, glucose 133  Risk and benefits of testing: discussed   Labs: ordered and reviewed  Radiology: ordered and reviewed   Discussion of management or test interpretation with external provider(s): discussed with critical care neurosurgery consultant   Summary of discussion:  As summarized above discussed with ICU resident, Dr. Rowley, and Dr. Moore     Risk  Decision regarding hospitalization  Shared decision making     Critical Care  30-74 minutes     IShawnee MD personally performed the history, PE, MDM, and procedures as documented above and agree with the scribe's documentation.           ED Course as of 08/08/23 1741   Mon Aug 07, 2023   1417 Discussed with Neurology, they will evaluate in consultation.  [KS]      ED Course User Index  [KS] Shawnee Guidry MD                 Clinical Impression:   Final diagnoses:  [I61.9] Intracerebral hemorrhage  [I61.2] Nontraumatic hemorrhage of right cerebral hemisphere (Primary)  [R90.0] Intracranial mass  [I10] Hypertension, unspecified type        ED Disposition Condition    Admit                 Shawnee Guidry MD  08/08/23 1741

## 2023-08-07 NOTE — SUBJECTIVE & OBJECTIVE
No past medical history on file.    No past surgical history on file.    Review of patient's allergies indicates:  No Known Allergies    Current Neurological Medications:     No current facility-administered medications on file prior to encounter.     Current Outpatient Medications on File Prior to Encounter   Medication Sig    apixaban (ELIQUIS) 2.5 mg Tab Take by mouth 2 (two) times daily.    diltiaZEM (CARDIZEM CD) 360 MG 24 hr capsule Take 360 mg by mouth once daily.    levocetirizine (XYZAL) 5 MG tablet Take 5 mg by mouth every evening.    meclizine (ANTIVERT) 50 MG tablet Take 25 mg by mouth 3 (three) times daily as needed.    multivitamin (THERAGRAN) per tablet Take 1 tablet by mouth once daily.    pravastatin (PRAVACHOL) 40 MG tablet Take 40 mg by mouth once daily.     Family History    None       Tobacco Use    Smoking status: Not on file    Smokeless tobacco: Not on file   Substance and Sexual Activity    Alcohol use: Not on file    Drug use: Not on file    Sexual activity: Not on file     Review of Systems   Unable to perform ROS: Acuity of condition     Objective:     Vital Signs (Most Recent):  Temp: 98.8 °F (37.1 °C) (08/07/23 1350)  Pulse: 64 (08/07/23 1600)  Resp: (!) 21 (08/07/23 1600)  BP: 139/76 (08/07/23 1600)  SpO2: 95 % (08/07/23 1600) Vital Signs (24h Range):  Temp:  [98.8 °F (37.1 °C)] 98.8 °F (37.1 °C)  Pulse:  [62-89] 64  Resp:  [16-22] 21  SpO2:  [95 %-98 %] 95 %  BP: (131-165)/(48-92) 139/76     Weight: 72 kg (158 lb 11.7 oz)  Body mass index is 25.62 kg/m².     Physical Exam  Vitals and nursing note reviewed.   Constitutional:       General: She is not in acute distress.     Appearance: She is not ill-appearing or toxic-appearing.   HENT:      Head: Normocephalic.   Eyes:      General: No visual field deficit.     Pupils: Pupils are equal, round, and reactive to light.   Pulmonary:      Effort: Pulmonary effort is normal.   Musculoskeletal:         General: Normal range of motion.       Cervical back: Normal range of motion.      Right lower leg: No edema.      Left lower leg: No edema.   Skin:     General: Skin is warm and dry.      Capillary Refill: Capillary refill takes less than 2 seconds.   Neurological:      Mental Status: She is alert.      Cranial Nerves: No dysarthria or facial asymmetry.      Sensory: No sensory deficit.      Motor: Weakness (BLE weakness) present. No tremor, atrophy, abnormal muscle tone, seizure activity or pronator drift.      Comments:     Unsure if she is confused, she seemed to know she was at the hospital and that her weakness happened a couple of days ago, she is unsure if she is on medications or what type of medical problems she has   Psychiatric:         Attention and Perception: Attention normal.         Behavior: Behavior is cooperative.          NEUROLOGICAL EXAMINATION:     CRANIAL NERVES     CN III, IV, VI   Pupils are equal, round, and reactive to light.      Significant Labs:   Recent Lab Results         08/07/23  1402        Albumin/Globulin Ratio 1.5       Albumin 3.8       Alkaline Phosphatase 143       ALT 11       aPTT 39.1  Comment: For Minimal Heparin Infusion, the goal aPTT 64-85 seconds corresponds to an anti-Xa of 0.3-0.5.    For Low Intensity and High Intensity Heparin, the goal aPTT  seconds corresponds to an anti-Xa of 0.3-0.7       AST 19       Baso # 0.01       Basophil % 0.2       BILIRUBIN TOTAL 1.0       BUN 12.0       Calcium 10.8       Chloride 105       CO2 30       Creatinine 0.91       eGFR 59       Eos # 0.04       Eosinophil % 0.8       Globulin, Total 2.5       Glucose 84       Hematocrit 38.0       Hemoglobin 12.9       Immature Grans (Abs) 0.01       Immature Granulocytes 0.2       INR 1.1       Lymph # 1.08       LYMPH % 22.7       MCH 31.1       MCHC 33.9       MCV 91.6       Mono # 0.42       Mono % 8.8       MPV 9.9       Neut # 3.19       Neut % 67.3       nRBC 0.0       Platelets 190       Potassium 3.7        PROTEIN TOTAL 6.3       Protime 14.1       RBC 4.15       RDW 12.8       Sodium 142       WBC 4.75               Significant Imaging: I have reviewed all pertinent imaging results/findings within the past 24 hours.

## 2023-08-07 NOTE — Clinical Note
Diagnosis: Intracerebral hemorrhage [431.ICD-9-CM]   Admitting Provider:: PATRICK MISHRA [[id number]]   Future Attending Provider: PATRICK MISHRA [[id number]]   Reason for IP Medical Treatment  (Clinical interventions that can only be accomplished in the IP setting? ) :: BP control, neuro work up   I certify that Inpatient services for greater than or equal to 2 midnights are medically necessary:: Yes   Plans for Post-Acute care--if anticipated (pick the single best option):: A. No post acute care anticipated at this time

## 2023-08-07 NOTE — ASSESSMENT & PLAN NOTE
Hemorrhagic stroke-transfer from [facility]      -hourly neuro checks ... notify neurology of any neuro change  -strict blood pressure control ... BP goal 130-150 systolic   -avoid antiplatelet or anticoagulation at this time  -seizure precautions ... notify neurology of any seizure-like activity  -needs MRI brain   -therapy eval   -elevate HOB

## 2023-08-08 LAB
ALBUMIN SERPL-MCNC: 3.2 G/DL (ref 3.4–4.8)
ALBUMIN/GLOB SERPL: 1.2 RATIO (ref 1.1–2)
ALP SERPL-CCNC: 120 UNIT/L (ref 40–150)
ALT SERPL-CCNC: 10 UNIT/L (ref 0–55)
AST SERPL-CCNC: 19 UNIT/L (ref 5–34)
BILIRUBIN DIRECT+TOT PNL SERPL-MCNC: 1 MG/DL
BUN SERPL-MCNC: 10.2 MG/DL (ref 9.8–20.1)
CALCIUM SERPL-MCNC: 10.3 MG/DL (ref 8.4–10.2)
CHLORIDE SERPL-SCNC: 107 MMOL/L (ref 98–111)
CO2 SERPL-SCNC: 27 MMOL/L (ref 23–31)
CREAT SERPL-MCNC: 0.79 MG/DL (ref 0.55–1.02)
GFR SERPLBLD CREATININE-BSD FMLA CKD-EPI: >60 MLS/MIN/1.73/M2
GLOBULIN SER-MCNC: 2.6 GM/DL (ref 2.4–3.5)
GLUCOSE SERPL-MCNC: 94 MG/DL (ref 75–121)
POTASSIUM SERPL-SCNC: 3.5 MMOL/L (ref 3.5–5.1)
PROT SERPL-MCNC: 5.8 GM/DL (ref 5.8–7.6)
SODIUM SERPL-SCNC: 142 MMOL/L (ref 132–146)

## 2023-08-08 PROCEDURE — 63600175 PHARM REV CODE 636 W HCPCS: Performed by: NURSE PRACTITIONER

## 2023-08-08 PROCEDURE — 97162 PT EVAL MOD COMPLEX 30 MIN: CPT

## 2023-08-08 PROCEDURE — 99233 SBSQ HOSP IP/OBS HIGH 50: CPT | Mod: ,,, | Performed by: SPECIALIST

## 2023-08-08 PROCEDURE — 97535 SELF CARE MNGMENT TRAINING: CPT

## 2023-08-08 PROCEDURE — A9577 INJ MULTIHANCE: HCPCS | Performed by: INTERNAL MEDICINE

## 2023-08-08 PROCEDURE — 11000001 HC ACUTE MED/SURG PRIVATE ROOM

## 2023-08-08 PROCEDURE — 99233 PR SUBSEQUENT HOSPITAL CARE,LEVL III: ICD-10-PCS | Mod: ,,, | Performed by: SPECIALIST

## 2023-08-08 PROCEDURE — 99222 1ST HOSP IP/OBS MODERATE 55: CPT | Mod: ,,, | Performed by: INTERNAL MEDICINE

## 2023-08-08 PROCEDURE — 25500020 PHARM REV CODE 255: Performed by: INTERNAL MEDICINE

## 2023-08-08 PROCEDURE — 80053 COMPREHEN METABOLIC PANEL: CPT | Performed by: NURSE PRACTITIONER

## 2023-08-08 PROCEDURE — 97166 OT EVAL MOD COMPLEX 45 MIN: CPT

## 2023-08-08 PROCEDURE — 63600175 PHARM REV CODE 636 W HCPCS

## 2023-08-08 PROCEDURE — 99222 PR INITIAL HOSPITAL CARE,LEVL II: ICD-10-PCS | Mod: ,,, | Performed by: INTERNAL MEDICINE

## 2023-08-08 PROCEDURE — 25000003 PHARM REV CODE 250

## 2023-08-08 RX ORDER — LORAZEPAM 2 MG/ML
1 INJECTION INTRAMUSCULAR ONCE
Status: DISCONTINUED | OUTPATIENT
Start: 2023-08-08 | End: 2023-08-09 | Stop reason: HOSPADM

## 2023-08-08 RX ORDER — CLONIDINE HYDROCHLORIDE 0.1 MG/1
0.1 TABLET ORAL EVERY 6 HOURS PRN
Status: DISCONTINUED | OUTPATIENT
Start: 2023-08-08 | End: 2023-08-09 | Stop reason: HOSPADM

## 2023-08-08 RX ADMIN — MUPIROCIN: 20 OINTMENT TOPICAL at 08:08

## 2023-08-08 RX ADMIN — HYDRALAZINE HYDROCHLORIDE 10 MG: 20 INJECTION INTRAMUSCULAR; INTRAVENOUS at 12:08

## 2023-08-08 RX ADMIN — LEVETIRACETAM 500 MG: 500 TABLET, FILM COATED ORAL at 08:08

## 2023-08-08 RX ADMIN — LABETALOL HYDROCHLORIDE 10 MG: 5 INJECTION, SOLUTION INTRAVENOUS at 08:08

## 2023-08-08 RX ADMIN — LABETALOL HYDROCHLORIDE 10 MG: 5 INJECTION, SOLUTION INTRAVENOUS at 02:08

## 2023-08-08 RX ADMIN — LABETALOL HYDROCHLORIDE 10 MG: 5 INJECTION, SOLUTION INTRAVENOUS at 06:08

## 2023-08-08 RX ADMIN — IOPAMIDOL 100 ML: 755 INJECTION, SOLUTION INTRAVENOUS at 05:08

## 2023-08-08 RX ADMIN — GADOBENATE DIMEGLUMINE 15 ML: 529 INJECTION, SOLUTION INTRAVENOUS at 12:08

## 2023-08-08 NOTE — PROGRESS NOTES
[facility] - 7th Floor ICU  Neurosurgery  Progress Note    Subjective:     Interval History: NAEs overnight. CT head done this morning.     History of Present Illness: 89 year old female on Eliquis transferred to [facility] from [facility] for evaluation after CT head revealed right intracranial hemorrhage. Family states patient began with left sided extremity weakness two days ago. She called her primary care who recommended the patient go to the ED. Patient also complains of generalized weakness and dizziness. Family denies any fall or injury. Family also notes a left sided facial droop. Patient was given K centra prior to transfer.     Post-Op Info:  * No surgery found *          Medications:  Continuous Infusions:  Scheduled Meds:   labetalol  10 mg Intravenous Q2H    levETIRAcetam  500 mg Oral BID    mupirocin   Nasal BID     PRN Meds:hydrALAZINE, labetaloL, sodium chloride 0.9%       Objective:     Weight: 74.6 kg (164 lb 7.4 oz)  Body mass index is 29.13 kg/m².  Vital Signs (Most Recent):  Temp: 98.8 °F (37.1 °C) (08/07/23 1350)  Pulse: 68 (08/07/23 1815)  Resp: 18 (08/07/23 1815)  BP: (!) 136/91 (08/07/23 1815)  SpO2: 97 % (08/07/23 1815) Vital Signs (24h Range):  Temp:  [98.8 °F (37.1 °C)] 98.8 °F (37.1 °C)  Pulse:  [61-89] 68  Resp:  [16-31] 18  SpO2:  [93 %-98 %] 97 %  BP: (130-165)/(48-92) 136/91                Oxygen Concentration (%):  [2] 2        Female External Urinary Catheter 08/07/23 1600 (Active)   Tolerance no signs/symptoms of discomfort 08/08/23 0400   Output (mL) 300 mL 08/07/23 1800       Neurosurgery Physical Exam  Awake, alert.   EOMI. PERRLA  Responds to verbal stimuli. Mumbled speech.   Moves all extremities. Left sided weakness.   Left facial droop.     Significant Labs:  Recent Labs   Lab 08/07/23  1402 08/08/23  0227    142   K 3.7 3.5   CO2 30 27   BUN 12.0 10.2   CREATININE 0.91 0.79   CALCIUM 10.8* 10.3*     Recent Labs   Lab 08/07/23  1402   WBC 4.75   HGB  12.9   HCT 38.0        Recent Labs   Lab 08/07/23  1402   INR 1.1     Microbiology Results (last 7 days)       ** No results found for the last 168 hours. **            Significant Diagnostics:  CT Head Without Contrast  Order: [id number]  Status: Preliminary result     Visible to patient: No (not released)     Next appt: None     0 Result Notes  Details    Reading Physician Reading Date Result Priority   Yrn Gamboa MD  [phone] 8/8/2023      Narrative & Impression  START OF REPORT:  Technique: CT of the head was performed without intravenous contrast with axial as well as coronal and sagittal images.     Comparison: Comparison is with study dated 2023-08-07 11:54:11.     Dosage Information: Automated exposure control was utilized.     Clinical history: Fu ich.     Findings:  Hemorrhage: There is a stable acute intraparenchymal hemorrhage measuring upto 4.6 cm with surrounding vasogenic edema noted in right fronto-parietal lobe which is extending into adjacent sulci causing mass effect in the form of midline shift of 5.3 mm to the left and compression of ipsilateral lateral ventricle.  CSF spaces: The ventricles, sulci and basal cisterns all appear mildly prominent consistent with global cerebral atrophy.  Brain parenchyma: Mild microvascular change is seen in portions of the periventricular and deep white matter tracts.  Cerebellum: Unremarkable.  Vascular: Unremarkable venous sinuses. Mild atheromatous calcification of the intracranial arteries is seen.  Sella and skull base: The sella appears to be within normal limits for age.  Cerebellopontine angles: Within normal limits.  Herniation: None.  Intracranial calcifications: Incidental note is made of bilateral choroid plexus calcification. Incidental note is made of some pineal region calcification.  Calvarium: No acute linear or depressed skull fracture is seen.     Maxillofacial Structures:  Paranasal sinuses: The visualized paranasal sinuses  appear clear with no mucoperiosteal thickening or air fluid levels identified.  Orbits: The orbits appear unremarkable.  Zygomatic arches: The zygomatic arches are intact and unremarkable.  Temporal bones and mastoids: The temporal bones and mastoids appear unremarkable.  TMJ: The mandibular condyles appear normally placed with respect to the mandibular fossa.  Nasal Bones: The nasal septum is deviated to left.     Visualized upper cervical spine: The visualized cervical spine appears unremarkable.        Impression:  1. There is a stable acute intraparenchymal hemorrhage measuring upto 4.6 cm with surrounding vasogenic edema noted in right fronto-parietal lobe which is extending into adjacent sulci causing mass effect in the form of midline shift of 5.3 mm to the left and compression of ipsilateral lateral ventricle. Correlate clinically as regards continued serial interval follow-up to full resolution as indicated.  2. Details and other findings as noted above.        This result has not been signed. Information might be incomplete.           Exam Ended: 08/08/23 03:05 Last Resulted: 08/08/23 03:05             Assessment/Plan:     Active Diagnoses:    Diagnosis Date Noted POA    Hemorrhagic stroke [I61.9] 08/07/2023 Yes    Nontraumatic cortical hemorrhage of right cerebral hemisphere [I61.1] 08/07/2023 Yes      Problems Resolved During this Admission:       -CT head done this morning showing stable IPH.   -Patient unable to tolerate MRI brain  -Neurology following for stroke evaluation  -Keppra for seizure prophylaxis.  -SCDs for DVT prophylaxis.  -BP less than 140/90  -Q1 neurochecks  -Will follow    AKIKO Beth  Neurosurgery  [facility] - 7th Floor ICU

## 2023-08-08 NOTE — PT/OT/SLP EVAL
Physical Therapy Evaluation    Patient Name:  Kristel Mayes   MRN:  [id number]    Recommendations:     Discharge Recommendations: rehabilitation facility   Discharge Equipment Recommendations: none   Barriers to discharge: Impaired mobility    Assessment:     Kristel Mayes is a 89 y.o. female admitted with a medical diagnosis of R frontoparietal ICH, generalized weakness, dizziness. She presents with the following impairments/functional limitations: weakness, impaired endurance, impaired functional mobility, gait instability, impaired balance, decreased lower extremity function, decreased safety awareness. Patient tolerated PT evaluation well, mobilizing with Chacha for transfers and gait with RW. Patient with BLE weakness, decreased endurance, and possible L-sided inattention. Pt is appropriate for continued acute PT services with recommended d/c to inpatient rehab vs home with HH PT and 24hr care.     Rehab Prognosis: Good; patient would benefit from acute skilled PT services to address these deficits and reach maximum level of function.    Recent Surgery: * No surgery found *      Plan:     During this hospitalization, patient to be seen 6 x/week to address the identified rehab impairments via gait training, therapeutic activities, therapeutic exercises, neuromuscular re-education and progress toward the following goals:    Plan of Care Expires:  09/08/23    Subjective     Chief Complaint: none stated  Patient/Family Comments/goals: to get stronger  Pain/Comfort:  Pain Rating 1: 0/10    Patients cultural, spiritual, Sabianism conflicts given the current situation: no    Living Environment:  Pt lives with family in [facility], no steps to enter.   Prior to admission, patients level of function was supervision for mobility with rolling walker, assist required for ADLs.  Equipment used at home: walker, rolling, rollator, cane, straight.  DME owned (not currently used): none.  Upon discharge, patient will have assistance from  family.    Objective:     Communicated with RN prior to session.  Patient found up in chair with blood pressure cuff, peripheral IV, pulse ox (continuous), telemetry  upon PT entry to room.    General Precautions: Standard, fall  Orthopedic Precautions:N/A   Braces: N/A  Respiratory Status: Room air, 97%  Blood Pressure: 141/62, HR: 67      Exams:  Cognitive Exam:  Patient is oriented to Person, Place, Time, and Situation  Sensation: -       Intact  RLE ROM: WFL  RLE Strength: grossly 4/5  LLE ROM: WFL  LLE Strength: grossly 3+/5  Skin integrity: Visible skin intact      Functional Mobility:  Transfers:  Sit to Stand:  minimum assistance with rolling walker  Gait: patient ambulated 80ft with Chacha and RW, shuffle gait with narrow CORETTA, L lateral sway, assist required for safe use of RW      AM-PAC 6 CLICK MOBILITY  Total Score:18       Treatment & Education:    Patient and family provided with verbal education regarding PT POC, safety with mobility.  Understanding was verbalized.     Patient left up in chair with all lines intact, call button in reach, and family present.    GOALS:   Multidisciplinary Problems       Physical Therapy Goals          Problem: Physical Therapy    Goal Priority Disciplines Outcome Goal Variances Interventions   Physical Therapy Goal     PT, PT/OT Ongoing, Progressing     Description: Goals to be met by: 23     Patient will increase functional independence with mobility by performin. Supine to sit with Modified [place]  2. Sit to stand transfer with Modified [place]  3. Gait  x 200 feet with Modified [place] using Rolling Walker.                          History:     No past medical history on file.    No past surgical history on file.    Time Tracking:     PT Received On: 23  PT Start Time: 1000     PT Stop Time: 1020  PT Total Time (min): 20 min     Billable Minutes: Evaluation Mod complexity      2023

## 2023-08-08 NOTE — NURSING
Nurses Note -- 4 Eyes      8/8/2023   10:47 AM      Skin assessed during: Q Shift Change      [x] No Altered Skin Integrity Present    [x]Prevention Measures Documented      [] Yes- Altered Skin Integrity Present or Discovered   [] LDA Added if Not in Epic (Describe Wound)   [] New Altered Skin Integrity was Present on Admit and Documented in LDA   [] Wound Image Taken    Wound Care Consulted? No    Attending Nurse:  Salud Ibrahim RN    Second RN/Staff Member:   GERARDO Kapoor

## 2023-08-08 NOTE — PLAN OF CARE
Problem: Physical Therapy  Goal: Physical Therapy Goal  Description: Goals to be met by: 23     Patient will increase functional independence with mobility by performin. Supine to sit with Modified [place]  2. Sit to stand transfer with Modified [place]  3. Gait  x 200 feet with Modified [place] using Rolling Walker.     Outcome: Ongoing, Progressing

## 2023-08-08 NOTE — PROGRESS NOTES
[facility] - 7th Floor ICU  Neurology  Progress Note    Patient Name: Kristel Mayes  MRN: [id number]  Admission Date: 8/7/2023  Hospital Length of Stay: 1 days  Code Status: DNR   Attending Provider: Xavier Johns MD  Primary Care Physician: Susie, Primary Doctor   Principal Problem:<principal problem not specified>    HPI:   89 year old female of HLD (on Eliquis 2.5 mg) presented to [facility] ED on 8/7 [place] [facility] [place]. She presented to [facility] for left sided weakness and left facial droop x 2-3 days. She was found to have a right frontoparietal hemorrhage with midline shift. She was given K centra prior to transfer. Upon arrival to ED, /92. Neurology was consulted for hemorrhagic stroke.               Overview/Hospital Course:  No notes on file        Subjective:     Interval History:   Neurologic exam remains unchanged.    Repeat CT head w/o this AM revealed stable appearance or R intraparenchymal hematoma per radiology report.     Current Neurological Medications:     Current Facility-Administered Medications   Medication Dose Route Frequency Provider Last Rate Last Admin    hydrALAZINE injection 10 mg  10 mg Intravenous Q4H PRN Shannan Ac DO        labetaloL injection 10 mg  10 mg Intravenous Q2H Kalani Torres NP   10 mg at 08/08/23 0603    labetaloL injection 10 mg  10 mg Intravenous Q6H PRN Shannan Ac DO        levETIRAcetam tablet 500 mg  500 mg Oral BID Day, AKIKO Caldwell   500 mg at 08/08/23 0857    mupirocin 2 % ointment   Nasal BID Xavier Johns MD   Given at 08/08/23 0857    sodium chloride 0.9% flush 10 mL  10 mL Intravenous PRN Kalani Torres NP           Review of Systems  Unable to perform ROS: Acuity of condition   Objective:     Vital Signs (Most Recent):  Temp: 98.7 °F (37.1 °C) (08/08/23 0400)  Pulse: 80 (08/08/23 0905)  Resp: (!) 29 (08/08/23 0900)  BP: (!) 141/62 (08/08/23 0905)  SpO2: 97 % (08/08/23 0900)  Vital Signs (24h Range):  Temp:  [98.1 °F (36.7 °C)-98.8 °F (37.1 °C)] 98.7 °F (37.1 °C)  Pulse:  [57-89] 80  Resp:  [6-38] 29  SpO2:  [92 %-98 %] 97 %  BP: ()/(48-93) 141/62     Weight: 74.6 kg (164 lb 7.4 oz)  Body mass index is 29.13 kg/m².     Physical Exam      Vitals and nursing note reviewed.   Constitutional:       General: She is not in acute distress.     Appearance: She is not ill-appearing or toxic-appearing.   HENT:      Head: Normocephalic.   Eyes:      General: No visual field deficit.     Pupils: Pupils are equal, round, and reactive to light.   Pulmonary:      Effort: Pulmonary effort is normal.   Musculoskeletal:         General: Normal range of motion.      Cervical back: Normal range of motion.      Right lower leg: No edema.      Left lower leg: No edema.   Skin:     General: Skin is warm and dry.      Capillary Refill: Capillary refill takes less than 2 seconds.   Neurological:      Mental Status: She is lethargic     Sensory: No sensory deficit.      Motor: Weakness (BLE weakness) present.        Significant Labs:   Recent Lab Results         08/08/23  0227   08/07/23  1718   08/07/23  1656   08/07/23  1402        Albumin/Globulin Ratio 1.2       1.5       Albumin 3.2       3.8       Alkaline Phosphatase 120       143       ALT 10       11       Ao peak kallie     1.58         Ao VTI     36.30         aPTT       39.1  Comment: For Minimal Heparin Infusion, the goal aPTT 64-85 seconds corresponds to an anti-Xa of 0.3-0.5.    For Low Intensity and High Intensity Heparin, the goal aPTT  seconds corresponds to an anti-Xa of 0.3-0.7       AST 19       19       AV valve area     2.26         YUKI by Velocity Ratio     2.19         AV mean gradient     6         AV index (prosthetic)     0.65         AV peak gradient     10         AV Velocity Ratio     0.63         Baso #       0.01       Basophil %       0.2       BILIRUBIN TOTAL 1.0       1.0       BSA     1.83         BUN 10.2        12.0       Calcium 10.3       10.8       Chloride 107       105       Cholesterol       174       CO2 27       30       Creatinine 0.79       0.91       Left Ventricle Relative Wall Thickness     0.46         E/A ratio     0.88         E/E' ratio     18.67         eGFR >60       59       Eos #       0.04       Eosinophil %       0.8       Estimated Avg Glucose   111.2           E wave deceleration time     232.00         FS     30         Globulin, Total 2.6       2.5       Glucose 94       84       HDL       51       Hematocrit       38.0       Hemoglobin       12.9       Hemoglobin A1C External   5.5           Immature Grans (Abs)       0.01       Immature Granulocytes       0.2       INR       1.1       IVSd     0.82         LA size     3.00         LA volume     90.70         LA Volume Index (Mod)     50.1         LVOT area     3.5         LDL Cholesterol External       113.00       LV LATERAL E/E' RATIO     16.80         LV SEPTAL E/E' RATIO     21.00         LV EDV BP     107.00         LV Diastolic Volume Index     59.12         LVIDd     4.79         LVIDs     3.37         LV mass     161.64         LV Mass Index     89         Left Ventricular Outflow Tract Mean Gradient     2.00         Left Ventricular Outflow Tract Mean Velocity     0.64         LVOT diameter     2.10         LVOT peak garcía     1.00         LVOT stroke volume     82.05         LVOT peak VTI     23.70         LV ESV BP     46.40         LV Systolic Volume Index     25.6         Lymph #       1.08       LYMPH %       22.7       MCH       31.1       MCHC       33.9       MCV       91.6       Mean e'     0.05         Mono #       0.42       Mono %       8.8       MPV       9.9       MV valve area p 1/2 method     2.29         MV valve area by continuity eq     2.16         MV mean gradient     2         MV peak gradient     5         MV Peak A García     0.96         MV Peak E García     0.84         MV stenosis pressure 1/2 time     96.00          MV VTI     37.9         Neut #       3.19       Neut %       67.3       nRBC       0.0       Jurado's Biplane MOD Ejection Fraction     6         Platelets       190       Potassium 3.5       3.7       PROTEIN TOTAL 5.8       6.3       Protime       14.1       PV peak gradient     5         PV PEAK VELOCITY     1.13         Posterior Wall     1.11         Est. RA pres     3         RBC       4.15       RDW       12.8       RV TB RVSP     6         Sodium 142       142       TAPSE     1.95         TDI SEPTAL     0.04         TDI LATERAL     0.05         Thyroid Stimulating Hormone       1.751       Total Cholesterol/HDL Ratio       3       Triglycerides       48       Triscuspid Valve Regurgitation Peak Gradient     25         TR Max García     2.5         TV resting pulmonary artery pressure     28         Very Low Density Lipoprotein       10       WBC       4.75       ZLVIDD     -0.44         ZLVIDS     0.68                 Significant Imaging:   CT head w/o 8/8/2023:     FINDINGS  Images were reviewed in subdural, brain, soft tissue, and bone windows.     Exam quality: Motion/streak artifact limits assessment of the posterior fossa.     Intraparenchymal hematoma at the right parietal lobe with surrounding hypoattenuation consistent with white matter edema grossly unchanged in comparison.  No areas of new or worsening intracranial hemorrhage or other suspicious interval changes identified.  Gray-white differentiation is maintained.  There is similar regional right cerebral mass effect with slight effacement of the lateral ventricle, but no worsening midline shift.  No expansile extra-axial collection is identified.  There is no hyperdense artery or vein.     Visualized osseous structures and extracranial soft tissues are unremarkable.     IMPRESSION  1. Stable appearance of right intraparenchymal hematoma and surrounding white matter edema.  2. No new or worsening intracranial abnormality.  ==========    I have  reviewed all pertinent imaging results/findings within the past 24 hours.    Assessment and Plan:     Hemorrhagic stroke  Hemorrhagic stroke-[place] [facility] [place]      -hourly neuro checks ... notify neurology of any neuro change  -strict blood pressure control ... BP goal 130-150 systolic   -avoid antiplatelet or anticoagulation at this time  -continue keppra 500 mg BID  -seizure precautions ... notify neurology of any seizure-like activity  -MRI brain w w/o ordered  -PT/OT ordered  -elevate HOB  -Further recommendations per MD                    VTE Risk Mitigation (From admission, onward)         Ordered     Reason for No Pharmacological VTE Prophylaxis  Once        Question:  Reasons:  Answer:  Risk of Bleeding    08/07/23 1613     IP VTE HIGH RISK PATIENT  Once         08/07/23 1613     Place sequential compression device  Until discontinued         08/07/23 1613                MATIAS Hernadez-BC  Inpatient Neurology  [facility] - 7th Floor ICU

## 2023-08-08 NOTE — PLAN OF CARE
Problem: Occupational Therapy  Goal: Occupational Therapy Goal  Description: Goals to be met by: 9/8/2023     Patient will increase functional independence with ADLs by performing:    LE Dressing with Set-up Assistance.  Grooming while standing at sink with Set-up Assistance.  Toileting from toilet with min Assistance for hygiene and clothing management.   Toilet transfer to toilet with Stand-by Assistance.    Outcome: Ongoing, Progressing

## 2023-08-08 NOTE — CONSULTS
"Inpatient Nutrition Evaluation    Admit Date: 8/7/2023   Total duration of encounter: 1 day    Nutrition Recommendation/Prescription     When feasible, oral diet as tolerated, goal: regular.  Add Boost Very High Calorie (provides 530 kcal, 22 g protein per serving) BID.    Nutrition Assessment     Chart Review    Reason Seen: physician consult for "assess dietary needs"    Malnutrition Screening Tool Results   Have you recently lost weight without trying?: No  Have you been eating poorly because of a decreased appetite?: No   MST Score: 0   Diagnosis:   Right frontoparietal intracranial hemorrhage with mild midline shift  Hypertension  Atrial fibrillation-controlled ventricular response    No past medical history on file.    Scheduled Medications:   labetalol  10 mg Intravenous Q2H    levETIRAcetam  500 mg Oral BID    lorazepam  1 mg Intravenous Once    mupirocin   Nasal BID     Recent Labs     08/07/23  1402 08/08/23  0227    142   K 3.7 3.5   CHLORIDE 105 107   CO2 30 27   CALCIUM 10.8* 10.3*   GLUCOSE 84 94   ALKPHOS 143 120   AST 19 19   ALT 11 10   ALBUMIN 3.8 3.2*     Diet Order: Diet NPO  Oral Supplement Order: none  Appetite/Oral Intake: good/% of meals  Factors Affecting Nutritional Intake: NPO  Food/Mandaeism/Cultural Preferences: none reported  Food Allergies: none reported    Wound(s):  none noted  Last Bowel Movement: 08/07/23    Comments    8/8/23 Patient up in chair during rounds, family at bedside. Patient is currently NPO pending NS recommendations, passed nursing swallow screen. Family reports a good appetite, no weight loss, drinks butter pecan Ensure at home, agreeable to vanilla Boost.    Anthropometrics    Height: 5' 3" (160 cm)    Last Weight: 74.6 kg (164 lb 7.4 oz) (08/07/23 1807) Weight Method: Bed Scale  BMI (Calculated): 29.1  BMI Classification: overweight (BMI 25-29.9)     Ideal Body Weight (IBW), Female: 115 lb     % Ideal Body Weight, Female (lb): 143.01 %                  "   Usual Body Weight (UBW), k kg  % Usual Body Weight: 103.83     Usual Weight Provided By: patient    Wt Readings from Last 5 Encounters:   23 74.6 kg (164 lb 7.4 oz)     Weight Change(s) Since Admission: +2.6 kg  Wt Readings from Last 1 Encounters:   23 1807 74.6 kg (164 lb 7.4 oz)   23 1350 72 kg (158 lb 11.7 oz)    Admit Weight: 72 kg (158 lb 11.7 oz) (23 1350)    Patient Education Not applicable.    Monitoring & Evaluation     Dietitian will monitor food and beverage intake.  Nutrition Risk/Follow-Up: low (follow-up in 5-7 days)  Patients assigned 'low nutrition risk' status do not qualify for a full nutritional assessment but will be monitored and re-evaluated in a 5-7 day time period. Please consult if re-evaluation needed sooner.

## 2023-08-08 NOTE — SUBJECTIVE & OBJECTIVE
Subjective:     Interval History:   Neurologic exam remains unchanged.    Repeat CT head w/o this AM revealed stable appearance or R intraparenchymal hematoma per radiology report.     Current Neurological Medications:     Current Facility-Administered Medications   Medication Dose Route Frequency Provider Last Rate Last Admin    hydrALAZINE injection 10 mg  10 mg Intravenous Q4H PRN Shannan Ac DO        labetaloL injection 10 mg  10 mg Intravenous Q2H Melissa, Kalani HARDWICK NP   10 mg at 08/08/23 0603    labetaloL injection 10 mg  10 mg Intravenous Q6H PRN Shannan Ac DO        levETIRAcetam tablet 500 mg  500 mg Oral BID Day, AKIKO Caldwell   500 mg at 08/08/23 0857    mupirocin 2 % ointment   Nasal BID Xavier Johns MD   Given at 08/08/23 0857    sodium chloride 0.9% flush 10 mL  10 mL Intravenous PRN Kalani Torres NP           Review of Systems  Unable to perform ROS: Acuity of condition   Objective:     Vital Signs (Most Recent):  Temp: 98.7 °F (37.1 °C) (08/08/23 0400)  Pulse: 80 (08/08/23 0905)  Resp: (!) 29 (08/08/23 0900)  BP: (!) 141/62 (08/08/23 0905)  SpO2: 97 % (08/08/23 0900) Vital Signs (24h Range):  Temp:  [98.1 °F (36.7 °C)-98.8 °F (37.1 °C)] 98.7 °F (37.1 °C)  Pulse:  [57-89] 80  Resp:  [6-38] 29  SpO2:  [92 %-98 %] 97 %  BP: ()/(48-93) 141/62     Weight: 74.6 kg (164 lb 7.4 oz)  Body mass index is 29.13 kg/m².     Physical Exam      Vitals and nursing note reviewed.   Constitutional:       General: She is not in acute distress.     Appearance: She is not ill-appearing or toxic-appearing.   HENT:      Head: Normocephalic.   Eyes:      General: No visual field deficit.     Pupils: Pupils are equal, round, and reactive to light.   Pulmonary:      Effort: Pulmonary effort is normal.   Musculoskeletal:         General: Normal range of motion.      Cervical back: Normal range of motion.      Right lower leg: No edema.      Left lower leg: No edema.   Skin:     General: Skin is  warm and dry.      Capillary Refill: Capillary refill takes less than 2 seconds.   Neurological:      Mental Status: She is lethargic     Sensory: No sensory deficit.      Motor: Weakness (BLE weakness) present.        Significant Labs:   Recent Lab Results         08/08/23  0227   08/07/23  1718   08/07/23  1656   08/07/23  1402        Albumin/Globulin Ratio 1.2       1.5       Albumin 3.2       3.8       Alkaline Phosphatase 120       143       ALT 10       11       Ao peak kallie     1.58         Ao VTI     36.30         aPTT       39.1  Comment: For Minimal Heparin Infusion, the goal aPTT 64-85 seconds corresponds to an anti-Xa of 0.3-0.5.    For Low Intensity and High Intensity Heparin, the goal aPTT  seconds corresponds to an anti-Xa of 0.3-0.7       AST 19       19       AV valve area     2.26         YUKI by Velocity Ratio     2.19         AV mean gradient     6         AV index (prosthetic)     0.65         AV peak gradient     10         AV Velocity Ratio     0.63         Baso #       0.01       Basophil %       0.2       BILIRUBIN TOTAL 1.0       1.0       BSA     1.83         BUN 10.2       12.0       Calcium 10.3       10.8       Chloride 107       105       Cholesterol       174       CO2 27       30       Creatinine 0.79       0.91       Left Ventricle Relative Wall Thickness     0.46         E/A ratio     0.88         E/E' ratio     18.67         eGFR >60       59       Eos #       0.04       Eosinophil %       0.8       Estimated Avg Glucose   111.2           E wave deceleration time     232.00         FS     30         Globulin, Total 2.6       2.5       Glucose 94       84       HDL       51       Hematocrit       38.0       Hemoglobin       12.9       Hemoglobin A1C External   5.5           Immature Grans (Abs)       0.01       Immature Granulocytes       0.2       INR       1.1       IVSd     0.82         LA size     3.00         LA volume     90.70         LA Volume Index (Mod)     50.1          LVOT area     3.5         LDL Cholesterol External       113.00       LV LATERAL E/E' RATIO     16.80         LV SEPTAL E/E' RATIO     21.00         LV EDV BP     107.00         LV Diastolic Volume Index     59.12         LVIDd     4.79         LVIDs     3.37         LV mass     161.64         LV Mass Index     89         Left Ventricular Outflow Tract Mean Gradient     2.00         Left Ventricular Outflow Tract Mean Velocity     0.64         LVOT diameter     2.10         LVOT peak garcía     1.00         LVOT stroke volume     82.05         LVOT peak VTI     23.70         LV ESV BP     46.40         LV Systolic Volume Index     25.6         Lymph #       1.08       LYMPH %       22.7       MCH       31.1       MCHC       33.9       MCV       91.6       Mean e'     0.05         Mono #       0.42       Mono %       8.8       MPV       9.9       MV valve area p 1/2 method     2.29         MV valve area by continuity eq     2.16         MV mean gradient     2         MV peak gradient     5         MV Peak A García     0.96         MV Peak E García     0.84         MV stenosis pressure 1/2 time     96.00         MV VTI     37.9         Neut #       3.19       Neut %       67.3       nRBC       0.0       Jurado's Biplane MOD Ejection Fraction     6         Platelets       190       Potassium 3.5       3.7       PROTEIN TOTAL 5.8       6.3       Protime       14.1       PV peak gradient     5         PV PEAK VELOCITY     1.13         Posterior Wall     1.11         Est. RA pres     3         RBC       4.15       RDW       12.8       RV TB RVSP     6         Sodium 142       142       TAPSE     1.95         TDI SEPTAL     0.04         TDI LATERAL     0.05         Thyroid Stimulating Hormone       1.751       Total Cholesterol/HDL Ratio       3       Triglycerides       48       Triscuspid Valve Regurgitation Peak Gradient     25         TR Max García     2.5         TV resting pulmonary artery pressure     28         Very Low  Density Lipoprotein       10       WBC       4.75       ZLVIDD     -0.44         ZLVIDS     0.68                 Significant Imaging:   CT head w/o 8/8/2023:     FINDINGS  Images were reviewed in subdural, brain, soft tissue, and bone windows.     Exam quality: Motion/streak artifact limits assessment of the posterior fossa.     Intraparenchymal hematoma at the right parietal lobe with surrounding hypoattenuation consistent with white matter edema grossly unchanged in comparison.  No areas of new or worsening intracranial hemorrhage or other suspicious interval changes identified.  Gray-white differentiation is maintained.  There is similar regional right cerebral mass effect with slight effacement of the lateral ventricle, but no worsening midline shift.  No expansile extra-axial collection is identified.  There is no hyperdense artery or vein.     Visualized osseous structures and extracranial soft tissues are unremarkable.     IMPRESSION  1. Stable appearance of right intraparenchymal hematoma and surrounding white matter edema.  2. No new or worsening intracranial abnormality.  ==========    I have reviewed all pertinent imaging results/findings within the past 24 hours.

## 2023-08-08 NOTE — PT/OT/SLP PROGRESS
SLP orders received, chart reviewed, and nursing consulted.  Pt transferred from outside hospital for neurosurgery services due to right frontoparietal ICH with midline shift.  Pt passed nursing swallow screen, however pt remains NPO pending NS recommendations.  Attempted to see pt for initial speech/language and cognitive evaluation, however pt fatigued following PT/OT.  SLP to re-attempt as appropriate.

## 2023-08-08 NOTE — PT/OT/SLP EVAL
Occupational Therapy  Evaluation    Name: Kristel Mayes  MRN: [id number]  Admitting Diagnosis: <principal problem not specified>  Recent Surgery: * No surgery found *      Recommendations:     Discharge Recommendations: rehabilitation facility  Discharge Equipment Recommendations:  to be determined by next level of care  Barriers to discharge:  None    Assessment:     Kristel Mayes is a 89 y.o. female with a medical diagnosis of R intracranial hemorrhage with L sided weakness, L sided facial droop, and dizziness.  She presents asleep in bed, easily awaken, agreeable to participate in therapy session. Performance deficits affecting function: weakness, impaired endurance, impaired self care skills, impaired functional mobility, impaired balance, gait instability.  Pt is pleasant and agreeable to participate in therapy. Pt's family reports assistance will be provided once d/c home. Pt required mod-max A and mod tc/vc while completing OT eval. Pt has L inattention while participating in ADL tasks. Pt would benefit from acute care services.    Rehab Prognosis: Good; patient would benefit from acute skilled OT services to address these deficits and reach maximum level of function.       Plan:     Patient to be seen 5 x/week to address the above listed problems via self-care/home management, therapeutic activities, therapeutic exercises  Plan of Care Expires: 09/08/23  Plan of Care Reviewed with: patient    Subjective     Chief Complaint: None  Patient/Family Comments/goals: To return home    Occupational Profile:  Living Environment: Pt lives with daughter at home with 4 AYAH. Pt has tub/shower combination within the restroom.  Previous level of function: Pt requires some assistance when needed  Roles and Routines: Mother, wife  Equipment Used at Home: wheelchair (Unclear if pt has rollator or RW.)  Assistance upon Discharge: Family    Pain/Comfort:  Pain Rating 1: 0/10    Patients cultural, spiritual, Advent conflicts  given the current situation: no    Objective:     Communicated with: Nurse prior to session.  Patient found HOB elevated with telemetry, pulse ox (continuous), PureWick, peripheral IV upon OT entry to room.    General Precautions: Standard, fall, -150 systolic   Orthopedic Precautions: N/A  Braces: N/A  Respiratory Status: Room air  Vital Signs: Blood Pressure: 125/74    Occupational Performance:    Bed Mobility:    Patient completed Scooting/Bridging with moderate assistance  Patient completed Supine to Sit with moderate assistance    Functional Mobility/Transfers:  Patient completed Sit <> Stand Transfer with minimum assistance  with  rolling walker   Patient completed Toilet Transfer Step Transfer technique with minimum assistance with  rolling walker  Functional Mobility: Pt required increased time and mod tc to reposition RW to midline. Pt demonstrates with L inattention while ambulating.     Activities of Daily Living:  Lower Body Dressing: maximal assistance to don purewick undergarment while standing with support from RW  Toileting: moderate assistance for clothing mgmt and toilet hygiene    Cognitive/Visual Perceptual:  Cognitive/Psychosocial Skills:     -       Oriented to: Person, Place, Time, and Situation   -       Follows Commands/attention:Follows multistep  commands  Visual/Perceptual:      -Impacted by L inattention      Physical Exam:  Upper Extremity Range of Motion:     -       Right Upper Extremity: WFL  -       Left Upper Extremity: WFL  Upper Extremity Strength:    -       Right Upper Extremity: WFL  -       Left Upper Extremity: WFL   Strength:    -       Right Upper Extremity: WFL  -       Left Upper Extremity: WFL    Therapeutic Positioning  Risk for acquired pressure injuries is increased due to impaired mobility.    OT interventions performed during the course of today's session in an effort to prevent and/or reduce acquired pressure injuries:   Therapeutic positioning completed      Skin assessment: all bony prominences were assessed    Findings: no redness or breakdown noted, meliplex on sacrum    OT recommendations for therapeutic positioning throughout hospitalization:   Geomat recommended for sacral protection while UIC      Patient Education:  Patient and daughter/s provided with verbal education regarding OT role/goals/POC.  Understanding was verbalized.     Patient left up in chair with all lines intact and call button in reach    GOALS:   Multidisciplinary Problems       Occupational Therapy Goals          Problem: Occupational Therapy    Goal Priority Disciplines Outcome Interventions   Occupational Therapy Goal     OT, PT/OT Ongoing, Progressing    Description: Goals to be met by: 9/8/2023     Patient will increase functional independence with ADLs by performing:    LE Dressing with Set-up Assistance.  Grooming while standing at sink with Set-up Assistance.  Toileting from toilet with min Assistance for hygiene and clothing management.   Toilet transfer to toilet with Stand-by Assistance.                         History:     No past medical history on file.    No past surgical history on file.    Time Tracking:     OT Date of Treatment: 08/08/23  OT Start Time: 0855  OT Stop Time: 0937  OT Total Time (min): 42 min    Billable Minutes:Evaluation MOD  Self Care/Home Management 1    8/8/2023

## 2023-08-08 NOTE — PROGRESS NOTES
[facility] - 7th Floor ICU  Pulmonary Critical Care Note    Patient Name: Kristel Mayes  MRN: [id number]  Admission Date: 8/7/2023  Hospital Length of Stay: 1 days  Code Status: DNR  Attending Provider: Xavier Johns MD  Primary Care Provider: Susie, Primary Doctor     Subjective:     HPI:   89-year-old female with past medical history of HTN and A fib on Eliqiuis presented to [facility] ER as a transfer from [facility] after presenting with left-sided weakness and left sided facial droop that began about 2-3 days prior accompanied by generalized weakness and dizziness. Patient and patient's family denies any fall or injury.   At [facility], patient was found to have a right frontoparietal intracranial hemorrhage with mild midline shift on head CT. Per report from the ER, she was given K centra prior to transfer for reversal of anticoagulation and had an NIH of 1.      24 Hour Interval History:  Patient stable overnight with no hemodynamic issues.  She is awake with no complaints.  Plans per Neurosurgery are noted.    No past medical history on file.    No past surgical history on file.    Social History     Socioeconomic History    Marital status:            Current Outpatient Medications   Medication Instructions    diltiaZEM (CARDIZEM CD) 360 mg, Oral, Daily    levocetirizine (XYZAL) 5 mg, Oral, Nightly    meclizine (ANTIVERT) 25 mg, Oral, 3 times daily PRN    multivitamin (THERAGRAN) per tablet 1 tablet, Oral, Daily    pravastatin (PRAVACHOL) 40 mg, Oral, Daily       Current Inpatient Medications   labetalol  10 mg Intravenous Q2H    levETIRAcetam  500 mg Oral BID    mupirocin   Nasal BID       Current Intravenous Infusions        Review of Systems   Unable to perform ROS: Dementia          Objective:       Intake/Output Summary (Last 24 hours) at 8/8/2023 0918  Last data filed at 8/8/2023 0800  Gross per 24 hour   Intake 50 ml   Output 1450 ml   Net -1400 ml          Vital Signs (Most Recent):  Temp: 98.7 °F (37.1 °C) (08/08/23 0400)  Pulse: 76 (08/08/23 0600)  Resp: (!) 24 (08/08/23 0600)  BP: (!) 152/63 (08/08/23 0600)  SpO2: 95 % (08/08/23 0600)  Body mass index is 29.13 kg/m².  Weight: 74.6 kg (164 lb 7.4 oz) Vital Signs (24h Range):  Temp:  [98.1 °F (36.7 °C)-98.8 °F (37.1 °C)] 98.7 °F (37.1 °C)  Pulse:  [60-89] 76  Resp:  [6-38] 24  SpO2:  [92 %-98 %] 95 %  BP: ()/(48-93) 152/63     Physical Exam  Constitutional:       General: She is not in acute distress.     Comments: Answering questions appropriately and cooperative with exam.   HENT:      Mouth/Throat:      Mouth: Mucous membranes are moist.   Eyes:      Extraocular Movements: Extraocular movements intact.      Pupils: Pupils are equal, round, and reactive to light.   Cardiovascular:      Rate and Rhythm: Normal rate and regular rhythm.      Heart sounds: Normal heart sounds. No murmur heard.     Comments: 2+ pedal and radial pulses B/L  Pulmonary:      Effort: Pulmonary effort is normal. No respiratory distress.      Breath sounds: Normal breath sounds. No wheezing.      Comments: Breathing comfortably on RA  Abdominal:      General: Abdomen is flat. There is no distension.      Palpations: Abdomen is soft. There is no mass.      Tenderness: There is no abdominal tenderness. There is no right CVA tenderness, left CVA tenderness, guarding or rebound.   Musculoskeletal:         General: Swelling (B/L lower extreities) present.      Right lower leg: No edema.      Left lower leg: No edema.      Comments: Able to lift B/L legs and arms off bed against resistance.  5/5  strength B/L   Skin:     General: Skin is warm.      Capillary Refill: Capillary refill takes less than 2 seconds.   Neurological:      Mental Status: She is alert and oriented to person, place, and time.      Comments: Sensation to light touch intact bilaterally. No facial droop noted     Lines/Drains/Airways       Drain  Duration              Female External Urinary Catheter 08/07/23 1600 <1 day              Peripheral Intravenous Line  Duration                  Peripheral IV - Single Lumen 08/07/23 Left Antecubital 1 day         Peripheral IV - Single Lumen 08/07/23 1402 22 G Right Antecubital <1 day                    Significant Labs:    Lab Results   Component Value Date    WBC 4.75 08/07/2023    HGB 12.9 08/07/2023    HCT 38.0 08/07/2023    MCV 91.6 08/07/2023     08/07/2023           BMP  Lab Results   Component Value Date     08/08/2023    K 3.5 08/08/2023    CHLORIDE 107 08/08/2023    CO2 27 08/08/2023    BUN 10.2 08/08/2023    CREATININE 0.79 08/08/2023    CALCIUM 10.3 (H) 08/08/2023       Mechanical Ventilation Support:  Oxygen Concentration (%): 2 (08/07/23 1600)      Significant Imaging:  I have reviewed the pertinent imaging within the past 24 hours.        Assessment/Plan:     Assessment  Right frontoparietal intracranial hemorrhage with mild midline shift  Hypertension  Atrial fibrillation-controlled ventricular response.      Plan  Neurology is recommending systolic blood pressure below 140.  Continue with Q 1 neuro checks per Neurosurgery.  CT head from this morning is pending review.  Monitor blood pressure closely.       MULU Gonzalez MD  Pulmonary Critical Care Medicine  [facility] - 7th Floor ICU

## 2023-08-08 NOTE — ASSESSMENT & PLAN NOTE
Hemorrhagic stroke-transfer from [facility]      -hourly neuro checks ... notify neurology of any neuro change  -strict blood pressure control ... BP goal 130-150 systolic   -avoid antiplatelet or anticoagulation at this time  -continue keppra 500 mg BID  -seizure precautions ... notify neurology of any seizure-like activity  -MRI brain w w/o ordered  -PT/OT ordered  -elevate HOB  -Further recommendations per MD

## 2023-08-08 NOTE — CONSULTS
Inpatient consult to Palliative Care  Consult performed by: Harlan Estrada MD  Consult ordered by: Shannan Ac DO      Patient Name: Kristel Mayes   MRN: [id number]   Admission Date: 8/7/2023   Hospital Length of Stay: 1   Attending Provider: Xavier Johns MD   Consulting Provider: Harlan Estrada M.D.  Reason for Consult: Goals of Care  Primary Care Physician: No, Primary Doctor     Principal Problem: <principal problem not specified>     Patient information was obtained from patient, relative(s), and ER records.      Final diagnoses:  [I61.9] Intracerebral hemorrhage     Assessment/Plan:     I reviewed the family's understanding of the seriousness of the illness and its expected prognosis. We discussed the patient's goals of care and treatment preferences. We discussed the difference between palliative and curative medicine. I explained the benefit of home hospice care. I clarified current code status. I identified the surrogate decision maker or health care POA.(DaughterPatricia services as HCPOA, on chart.) I reviewed an LaPost. Family will complete at a later time. We discussed the patient's chosen code status as listed above and the contents of the LaPOST. I answered all questions and we formulated a plan including recommendations for symptom management and how to best achieve goals of care.     I reviewed the patient's current clinical status with the nurse. I reviewed clinical documentation, labs and imaging.     Symptom management review: She denies pain, dyspnea, nausea, constipation, anxiety or other symptoms.    Advance Care Planning     Date: 08/08/2023    Code Status  In light of the patients advanced and life limiting illness,I engaged the the family in a voluntary conversation about the patient's preferences for care  at the very end of life. The patient wishes to have a natural, peaceful death.  Along those lines, the patient does not wish to have CPR or other invasive treatments  performed when her heart and/or breathing stops. I communicated to the family that a DNR order would be placed in her medical record to reflect this preference.  I reviewed an LAPOST. The HCPOA selects DNR and comfort care measures, however has not yet decided for or against artificial nutrition by tube. The documents will be completed after further reviewed by family.     [facility]  I engaged the family in a voluntary conversation about advance care planning and we specifically addressed what the goals of care would be moving forward, in light of the patient's change in clinical status, specifically acute changes in the patient's condition over the last few days which included weakness and confusion. She is found to have 2 areas within the brain with masses that are concerning for mets. There was surrounding bleeding and edema. Her family speaks for the patient and her wishes.     They wish to obtain imaging for staging. They would not elect biopsy or aggressive treatment such as chemo or radiation if the diagnosis remains as clinically an advanced cancer. We did specifically address the patient's likely prognosis, which is poor, if this is a stage IV cancer. We explored the patient's values and preferences for future care.  The family endorses that what is most important right now is to focus on spending time at home, avoiding the hospital, symptom/pain control, quality of life, even if it means sacrificing a little time, improvement in condition but with limits to invasive therapies, and comfort and QOL     We reviewed other goals which include whether or not to continue Eliquis due to the risk of bleeding from brain mets and from a fall vs. Risk of stroke due to atrial fibrillation. My recommendations are to avoid the use of Eliquis or anticoagulants as the patient has already bled in her brain due to mets. Family will decide upon this.    Accordingly, we have decided that the best plan to meet the patient's goals  includes enrolling in hospice care    I did explain the role for hospice care at this stage of the patient's illness, including its ability to help the patient live with the best quality of life possible.  We will be making a hospice referral.        Disposition: Home with hospice care after completion of staging work-up. Family is not interested in biopsy or any testing that may prove uncomfortable for the patient.    History of Present Illness:     89 y/o F h/o Atrial fib on Eliquis.      Active Ambulatory Problems     Diagnosis Date Noted    No Active Ambulatory Problems     Resolved Ambulatory Problems     Diagnosis Date Noted    No Resolved Ambulatory Problems     No Additional Past Medical History        No past surgical history on file.     Review of patient's allergies indicates:  No Known Allergies       Current Facility-Administered Medications:     hydrALAZINE injection 10 mg, 10 mg, Intravenous, Q4H PRN, Shannan Ac DO, 10 mg at 08/08/23 1236    labetaloL injection 10 mg, 10 mg, Intravenous, Q2H, Kalani Torres, NP, 10 mg at 08/08/23 1441    labetaloL injection 10 mg, 10 mg, Intravenous, Q6H PRN, Shannan Ac DO    levETIRAcetam tablet 500 mg, 500 mg, Oral, BID, Day, Javi PA, 500 mg at 08/08/23 0857    LORazepam injection 1 mg, 1 mg, Intravenous, Once, Ninoska Winston, CORINNE    mupirocin 2 % ointment, , Nasal, BID, Xavier Johns MD, Given at 08/08/23 0857    sodium chloride 0.9% flush 10 mL, 10 mL, Intravenous, PRN, Kalani Torres, NP     hydrALAZINE, labetaloL, sodium chloride 0.9%     No family history on file.     Review of Systems   HENT:  Negative for sore throat and trouble swallowing.    Respiratory:  Negative for shortness of breath.    Cardiovascular:  Negative for chest pain.   Gastrointestinal:  Negative for abdominal pain and constipation.   Musculoskeletal:  Negative for arthralgias and back pain.            Objective:   BP (!) 152/70   Pulse 72   Temp 98.7 °F (37.1  "°C)   Resp 15   Ht 5' 3" (1.6 m)   Wt 74.6 kg (164 lb 7.4 oz)   SpO2 95%   BMI 29.13 kg/m²      Physical Exam  Vitals reviewed.   Constitutional:       General: She is not in acute distress.     Appearance: She is not ill-appearing or toxic-appearing.   HENT:      Head: Normocephalic.      Right Ear: External ear normal.      Left Ear: External ear normal.      Nose: Nose normal.      Mouth/Throat:      Mouth: Mucous membranes are moist.      Pharynx: Oropharynx is clear.   Eyes:      Extraocular Movements: Extraocular movements intact.      Conjunctiva/sclera: Conjunctivae normal.      Pupils: Pupils are equal, round, and reactive to light.   Cardiovascular:      Rate and Rhythm: Normal rate and regular rhythm.      Pulses: Normal pulses.      Heart sounds: Normal heart sounds.   Pulmonary:      Effort: Pulmonary effort is normal.      Breath sounds: Normal breath sounds.   Abdominal:      General: Abdomen is flat. Bowel sounds are normal.      Palpations: Abdomen is soft.   Neurological:      Mental Status: She is alert.      Comments: She appeared to fumble with following of commands by squeezing both hands, she hesitated for a while with the left hand. She was alert and oriented to self and place, not situation or time.   Psychiatric:         Mood and Affect: Mood normal.         Thought Content: Thought content normal.            Review of Symptoms  Review of Symptoms      Symptom Assessment (ESAS 0-10 Scale)  Pain:  0  Dyspnea:  0  Anxiety:  0  Nausea:  0  Depression:  0  Anorexia:  0  Fatigue:  0  Insomnia:  0  Restlessness:  0  Agitation:  0     CAM / Delirium:  Negative  Constipation:  Negative  Diarrhea:  Negative    Constipation:  No constipation    Bowel Management Plan (BMP):  Yes      Pain Assessment:  OME in 24 hours:  0  Location(s):      Modified Sheila Scale:  0    Living Arrangements:  Lives in home and Lives with family    Psychosocial/Cultural:   See Palliative Psychosocial Note: No  Pt lives " with daughter and has 8 children. She is never alone  **Primary  to Follow**  Palliative Care  Consult: No    Spiritual:  F - Roxie and Belief:  Protestant  A - Address in Care:  She has 2 daughters that are pastors     Time-Based Charting:  Yes    Total Time Spent: 0 minutes      Advance Care Planning   Advance Directives:   Living Will: No    LaPOST: No    Do Not Resuscitate Status: Yes    Medical Power of : Yes    Agent's Name:  Patricia Gregg[facility]   Agent's Contact Number:  [phone]    Decision Making:  Patient answered questions and Family answered questions            Caregiver burden formerly assessed: Yes        > 50% of 65 min of encounter was spent in chart review, face to face discussion of goals of care, symptom assessment, coordination of care and emotional support.     Harlan Estrada M.D.  Palliative Medicine  [facility] - Observation Unit

## 2023-08-09 VITALS
SYSTOLIC BLOOD PRESSURE: 139 MMHG | OXYGEN SATURATION: 98 % | HEART RATE: 79 BPM | WEIGHT: 164.44 LBS | BODY MASS INDEX: 29.14 KG/M2 | RESPIRATION RATE: 28 BRPM | HEIGHT: 63 IN | DIASTOLIC BLOOD PRESSURE: 97 MMHG | TEMPERATURE: 99 F

## 2023-08-09 LAB
ALBUMIN SERPL-MCNC: 3.2 G/DL (ref 3.4–4.8)
ALBUMIN/GLOB SERPL: 1.4 RATIO (ref 1.1–2)
ALP SERPL-CCNC: 121 UNIT/L (ref 40–150)
ALT SERPL-CCNC: 10 UNIT/L (ref 0–55)
AST SERPL-CCNC: 22 UNIT/L (ref 5–34)
BASOPHILS # BLD AUTO: 0.01 X10(3)/MCL
BASOPHILS NFR BLD AUTO: 0.2 %
BILIRUB SERPL-MCNC: 0.9 MG/DL
BUN SERPL-MCNC: 9.3 MG/DL (ref 9.8–20.1)
CALCIUM SERPL-MCNC: 10.3 MG/DL (ref 8.4–10.2)
CHLORIDE SERPL-SCNC: 105 MMOL/L (ref 98–111)
CO2 SERPL-SCNC: 24 MMOL/L (ref 23–31)
CREAT SERPL-MCNC: 0.76 MG/DL (ref 0.55–1.02)
EOSINOPHIL # BLD AUTO: 0.11 X10(3)/MCL (ref 0–0.9)
EOSINOPHIL NFR BLD AUTO: 2.6 %
ERYTHROCYTE [DISTWIDTH] IN BLOOD BY AUTOMATED COUNT: 12.9 % (ref 11.5–17)
GFR SERPLBLD CREATININE-BSD FMLA CKD-EPI: >60 MLS/MIN/1.73/M2
GLOBULIN SER-MCNC: 2.3 GM/DL (ref 2.4–3.5)
GLUCOSE SERPL-MCNC: 88 MG/DL (ref 75–121)
HCT VFR BLD AUTO: 38.6 % (ref 37–47)
HGB BLD-MCNC: 13.1 G/DL (ref 12–16)
IMM GRANULOCYTES # BLD AUTO: 0.01 X10(3)/MCL (ref 0–0.04)
IMM GRANULOCYTES NFR BLD AUTO: 0.2 %
LYMPHOCYTES # BLD AUTO: 0.85 X10(3)/MCL (ref 0.6–4.6)
LYMPHOCYTES NFR BLD AUTO: 20.4 %
MCH RBC QN AUTO: 31 PG (ref 27–31)
MCHC RBC AUTO-ENTMCNC: 33.9 G/DL (ref 33–36)
MCV RBC AUTO: 91.3 FL (ref 80–94)
MONOCYTES # BLD AUTO: 0.34 X10(3)/MCL (ref 0.1–1.3)
MONOCYTES NFR BLD AUTO: 8.2 %
NEUTROPHILS # BLD AUTO: 2.85 X10(3)/MCL (ref 2.1–9.2)
NEUTROPHILS NFR BLD AUTO: 68.4 %
NRBC BLD AUTO-RTO: 0 %
PLATELET # BLD AUTO: 172 X10(3)/MCL (ref 130–400)
PMV BLD AUTO: 10 FL (ref 7.4–10.4)
POTASSIUM SERPL-SCNC: 3.7 MMOL/L (ref 3.5–5.1)
PROT SERPL-MCNC: 5.5 GM/DL (ref 5.8–7.6)
RBC # BLD AUTO: 4.23 X10(6)/MCL (ref 4.2–5.4)
SODIUM SERPL-SCNC: 138 MMOL/L (ref 132–146)
WBC # SPEC AUTO: 4.17 X10(3)/MCL (ref 4.5–11.5)

## 2023-08-09 PROCEDURE — 97530 THERAPEUTIC ACTIVITIES: CPT | Mod: CQ

## 2023-08-09 PROCEDURE — 25000003 PHARM REV CODE 250

## 2023-08-09 PROCEDURE — 97530 THERAPEUTIC ACTIVITIES: CPT | Mod: CO

## 2023-08-09 PROCEDURE — 99232 PR SUBSEQUENT HOSPITAL CARE,LEVL II: ICD-10-PCS | Mod: ,,, | Performed by: INTERNAL MEDICINE

## 2023-08-09 PROCEDURE — 25000003 PHARM REV CODE 250: Performed by: INTERNAL MEDICINE

## 2023-08-09 PROCEDURE — 85025 COMPLETE CBC W/AUTO DIFF WBC: CPT | Performed by: INTERNAL MEDICINE

## 2023-08-09 PROCEDURE — 27000221 HC OXYGEN, UP TO 24 HOURS

## 2023-08-09 PROCEDURE — 99232 SBSQ HOSP IP/OBS MODERATE 35: CPT | Mod: ,,, | Performed by: INTERNAL MEDICINE

## 2023-08-09 PROCEDURE — 80053 COMPREHEN METABOLIC PANEL: CPT | Performed by: INTERNAL MEDICINE

## 2023-08-09 RX ORDER — LEVETIRACETAM 500 MG/1
500 TABLET ORAL 2 TIMES DAILY
Qty: 60 TABLET | Refills: 0 | Status: SHIPPED | OUTPATIENT
Start: 2023-08-09 | End: 2023-09-08

## 2023-08-09 RX ORDER — LEVETIRACETAM 500 MG/1
500 TABLET ORAL 2 TIMES DAILY
Qty: 60 TABLET | Refills: 0 | Status: SHIPPED | OUTPATIENT
Start: 2023-08-09 | End: 2023-08-09 | Stop reason: SDUPTHER

## 2023-08-09 RX ORDER — LEVETIRACETAM 500 MG/1
500 TABLET ORAL 2 TIMES DAILY
Qty: 60 TABLET | Refills: 11
Start: 2023-08-09 | End: 2023-08-09 | Stop reason: SDUPTHER

## 2023-08-09 RX ORDER — LOSARTAN POTASSIUM 25 MG/1
25 TABLET ORAL DAILY
Status: DISCONTINUED | OUTPATIENT
Start: 2023-08-09 | End: 2023-08-09

## 2023-08-09 RX ORDER — PRAVASTATIN SODIUM 40 MG/1
40 TABLET ORAL DAILY
Status: DISCONTINUED | OUTPATIENT
Start: 2023-08-09 | End: 2023-08-09 | Stop reason: HOSPADM

## 2023-08-09 RX ORDER — DILTIAZEM HYDROCHLORIDE 120 MG/1
360 CAPSULE, COATED, EXTENDED RELEASE ORAL DAILY
Status: DISCONTINUED | OUTPATIENT
Start: 2023-08-09 | End: 2023-08-09 | Stop reason: HOSPADM

## 2023-08-09 RX ADMIN — DILTIAZEM HYDROCHLORIDE 360 MG: 120 CAPSULE, COATED, EXTENDED RELEASE ORAL at 08:08

## 2023-08-09 RX ADMIN — MUPIROCIN: 20 OINTMENT TOPICAL at 08:08

## 2023-08-09 RX ADMIN — PRAVASTATIN SODIUM 40 MG: 40 TABLET ORAL at 08:08

## 2023-08-09 RX ADMIN — LEVETIRACETAM 500 MG: 500 TABLET, FILM COATED ORAL at 08:08

## 2023-08-09 NOTE — PLAN OF CARE
Spoke to patient daughters (Jasper) and she stated that they want patient to go home with [facility], referral sent.     08/09/23 9171   Discharge Assessment   Assessment Type Discharge Planning Assessment   Confirmed/corrected address, phone number and insurance Yes   Confirmed Demographics Correct on Facesheet   Source of Information family   Does patient/caregiver understand observation status Yes   Communicated GABBI with patient/caregiver Yes;Date not available/Unable to determine   People in Home child(richard), adult   Do you expect to return to your current living situation? Yes   Walking or Climbing Stairs ambulation difficulty, requires equipment   Equipment Currently Used at Home walker, rolling;rollator;wheelchair;cane, straight   Readmission within 30 days? No   Do you currently have service(s) that help you manage your care at home? No   Do you take prescription medications? Yes   Do you have prescription coverage? Yes   Coverage [employer] Managed Medicare   Discharge Plan A Hospice/home   Discharge Plan B   (To be determined)   Discharge Plan discussed with: Adult children

## 2023-08-09 NOTE — H&P
[facility] Hospital Medicine History & Physical Examination       Patient Name: Kristel Mayes  MRN: [id number]  Patient Class: IP- Inpatient   Admission Date: 8/7/2023  1:48 PM  Length of Stay: 1  Admitting Service: Hospital Medicine   Attending Physician: John Goff MD   Primary Care Provider: No, Primary Doctor  History source: EMR, patient and/or patient's family    CHIEF COMPLAINT   Cerebrovascular Accident (CVA from [place] L sided weakness since Saturday. No falls. Increased dizziness and weakness. /Kcentra given prior to transport. Dx c R frontal/parietal ICH c midline shift )    HISTORY OF PRESENT ILLNESS:   Patient is a 89-year-old female with a history of hypertension and AFib on Eliquis who [place] [facility] [place]-sided [place] with CT showing acute right frontoparietal intracranial hemorrhage and midline shift, she was transferred from that facility to the ICU at this facility on 08/07/2023.  She received Kcentra.  She was seen by Neurosurgery and Neurology with recommendations for strict blood pressure control.  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  CT chest abdomen and pelvis showed no obvious primary.  Palliative Care met with patient and family members today and decision was made for hospice and comfort care measures.  She is being downgraded from the ICU continued care.      PAST MEDICAL HISTORY:   Hypertension  AFib on Eliquis    PAST SURGICAL HISTORY:   Reviewed and noncontributory    ALLERGIES:   Patient has no known allergies.    FAMILY HISTORY:   Reviewed and non-contributory     SOCIAL HISTORY:     Social History     Tobacco Use    Smoking status: Not on file    Smokeless tobacco: Not on file   Substance Use Topics    Alcohol use: Not on file        HOME MEDICATIONS:     Prior to Admission medications    Medication Sig Start Date End Date Taking? Authorizing Provider   diltiaZEM (CARDIZEM  "CD) 360 MG 24 hr capsule Take 360 mg by mouth once daily.   Yes Provider, Historical   levocetirizine (XYZAL) 5 MG tablet Take 5 mg by mouth every evening.   Yes Provider, Historical   meclizine (ANTIVERT) 50 MG tablet Take 25 mg by mouth 3 (three) times daily as needed.   Yes Provider, Historical   multivitamin (THERAGRAN) per tablet Take 1 tablet by mouth once daily.   Yes Provider, Historical   pravastatin (PRAVACHOL) 40 MG tablet Take 40 mg by mouth once daily.   Yes Provider, Historical       REVIEW OF SYSTEMS:   Except as documented, all other systems reviewed and negative     PHYSICAL EXAM:   T 97.8 °F (36.6 °C)   BP (!) 157/67   P 62   RR (!) 25   O2 95 %  GENERAL: awake, alert, oriented and in no acute distress, non-toxic appearing   HEENT: normocephalic atraumatic   NECK: supple   LUNGS: Clear bilaterally, no wheezing or rales, no of extremities accessory muscle use   CVS: Regular rate and rhythm, normal peripheral perfusion  ABD: Soft, non-tender, non-distended, bowel sounds present  EXTREMITIES: no clubbing or cyanosis  SKIN: Warm, dry.   NEURO: alert oriented , left upper extremity weak compared to remainder  PSYCHIATRIC: Cooperative    LABS AND IMAGING:     Recent Labs     08/07/23  1402   WBC 4.75   RBC 4.15*   HGB 12.9   HCT 38.0   MCV 91.6   MCH 31.1*   MCHC 33.9   RDW 12.8        No results for input(s): "LACTIC" in the last 72 hours.  Recent Labs     08/07/23  1402   INR 1.1     Recent Labs     08/07/23  1402 08/07/23  1718   HGBA1C  --  5.5   CHOL 174  --    TRIG 48  --    .00  --    VLDL 10  --    HDL 51  --       Recent Labs     08/07/23  1402 08/08/23  0227    142   K 3.7 3.5   CHLORIDE 105 107   CO2 30 27   BUN 12.0 10.2   CREATININE 0.91 0.79   GLUCOSE 84 94   CALCIUM 10.8* 10.3*   ALBUMIN 3.8 3.2*   GLOBULIN 2.5 2.6   ALKPHOS 143 120   ALT 11 10   AST 19 19   BILITOT 1.0 1.0   TSH 1.751  --      No results for input(s): "BNP", "CPK", "TROPONINI" in the last 72 hours.   "     CT Abdomen Pelvis W Wo Contrast  Narrative: EXAMINATION:  CT CHEST ABDOMEN PELVIS W WO CONTRAST    CLINICAL HISTORY:  Metastatic disease evaluation;    TECHNIQUE:  Low dose axial images, sagittal and coronal reformations were obtained from the thoracic inlet to the pubic symphysis following the IV and oral contrast administration.  Automatic exposure control is utilized to reduce patient radiation exposure.    COMPARISON:  None available    FINDINGS:  Note the chest abdomen and pelvis are dictated in conjunction    There are chronic interstitial lung changes seen bilaterally.  There is some mild bronchiectasis in the upper lobes.  There is a calcified granuloma in the right upper lobe.  There is eventration of the left hemidiaphragm.  There is right lower lobe atelectasis.  No lung mass is seen..  There is a small right-sided pleural effusion..  No infiltrate is seen.    The thoracic aorta is normal in caliber..    No mediastinal adenopathy is seen.    The heart appears normal in size..    There is some wall thickening seen in the distal esophagus at the GE junction.  This could represent inflammatory process.  Correlation with the clinical presentation is recommended.    There is area of calcification seen along the dome of the liver.  There is a cyst seen in segment 7 of the liver.  Portal and hepatic veins appear normal..    The gallbladder has some areas of hyperattenuation within it.  Ultrasound correlation is recommended..    The spleen appears normal.  No splenic mass or lesion is seen.    The pancreas appears grossly unremarkable.  No pancreatic mass or lesion is seen.  No inflammation is seen.    No adrenal abnormality is seen.  No adrenal nodule is seen.    The kidneys are well perfused.  There is some cysts in the right kidney.  No hydronephrosis is seen.  No hydroureter is seen.  No retroperitoneal abnormality is seen..    Visualized portions of the bowel shows no acute abnormality.  No colitis is  seen.  No diverticulitis is seen.  No colonic mass is seen.    No free air is seen.  No free fluid is seen.    Urinary bladder appears unremarkable.    The uterus has some increased vascularity associated with it.  Findings could represent pelvic congestion.    No retroperitoneal lymphadenopathy is seen.    No acute bony abnormality is seen.  Impression: No convincing evidence of metastatic disease seen    Chronic interstitial lung changes bilaterally with a small right-sided pleural effusion    Liver and renal cysts    Some thickening at the GE junction possibly representing  esophagitis.    Electronically signed by: Ryan Katz  Date:    08/08/2023  Time:    18:02  CT Chest With Contrast  Narrative: EXAMINATION:  CT CHEST WITH CONTRAST    CLINICAL HISTORY:  Brain metastases, monitor;    TECHNIQUE:  Helical acquired axial images, sagittal and coronal reformations were obtained from the thoracic inlet to the lung bases following the IV administration of contrast.    Automated tube current modulation, weight-based exposure dosing, and/or iterative reconstruction technique utilized to reach lowest reasonably achievable exposure rate.    DLP: 1154 mGy*cm    COMPARISON:  No relevant priors available for comparison at time of this dictation    FINDINGS:  BASE OF NECK: Calcified nodule at the right lobe of the thyroid measuring less than 15 mm.  Per consensus guidelines no follow-up necessary.    AORTA: Aortic atherosclerosis.    HEART: There are coronary artery calcifications.    PULMONARY VASCULATURE: Pulmonary arteries enhance normally.    KESHA/MEDIASTINUM: No enlarged lymph nodes by size criteria.    AIRWAYS: Patent.    LUNGS/PLEURA: Elevation of the left hemidiaphragm.  Bibasilar atelectasis.  Biapical scarring.  Calcified granulomata within the lungs.  There are calcified pleural plaques..    UPPER ABDOMEN: See separately dictated report for CT of the abdomen and pelvis.    THORACIC SOFT TISSUES:  Unremarkable.    BONES: No acute fracture. No suspicious lytic or sclerotic lesions.  Impression: No convincing evidence of metastatic disease within the chest    Calcified pleural plaques    Bibasilar atelectasis    Electronically signed by: Lady Machado  Date:    08/08/2023  Time:    18:00  MRI Brain W WO Contrast  Narrative: EXAMINATION:  MRI BRAIN W WO CONTRAST    CLINICAL HISTORY:  Stroke, follow up;    TECHNIQUE:  Multiplanar, multisequence MR images of the brain were obtained with and without administration of intravenous contrast.    COMPARISON:  CT head dated 08/08/2023    FINDINGS:  There is a hemorrhagic mass in the right frontoparietal lobe, overall measuring 3.8 x 4.1 x 3.4 cm in size, and solid component measuring 2.6 x 3.8 cm in size.  An additional rim enhancing mass in the right temporal lobe measures 1.3 x 1.4 cm (series 10, image 18).  There is surrounding edema throughout the right posterior cerebral hemisphere and temporal lobe.  There is thin dural enhancement along the right cerebral convexity.  Moderate additional patchy T2/FLAIR hyperintensities in the supratentorial white matter likely represent chronic microvascular ischemic changes.    There is mass effect with partial effacement the right lateral ventricle and 5 mm of leftward midline shift.  The basal cisterns are patent.  The major intracranial flow voids are patent.  The paranasal sinuses are clear.  Impression: Large hemorrhagic right frontoparietal lobe mass with smaller right temporal lobe mass and significant surrounding edema, concerning for metastatic disease.    Electronically signed by: Sri Estrada  Date:    08/08/2023  Time:    13:47  CT Head Without Contrast  EXAMINATION  CT HEAD WITHOUT CONTRAST    CLINICAL HISTORY  right ICH;    TECHNIQUE  Axial non-contrast CT images of the head were acquired and multiplanar reconstructions accomplished by a CT technologist at a separate workstation, pushed to PACS for physician  review.    COMPARISON  Outside facility head CT obtained 7 August 2023.    FINDINGS  Images were reviewed in subdural, brain, soft tissue, and bone windows.    Exam quality: Motion/streak artifact limits assessment of the posterior fossa.    Intraparenchymal hematoma at the right parietal lobe with surrounding hypoattenuation consistent with white matter edema grossly unchanged in comparison.  No areas of new or worsening intracranial hemorrhage or other suspicious interval changes identified.  Gray-white differentiation is maintained.  There is similar regional right cerebral mass effect with slight effacement of the lateral ventricle, but no worsening midline shift.  No expansile extra-axial collection is identified.  There is no hyperdense artery or vein.    Visualized osseous structures and extracranial soft tissues are unremarkable.    IMPRESSION  1. Stable appearance of right intraparenchymal hematoma and surrounding white matter edema.  2. No new or worsening intracranial abnormality.  ==========    No significant discrepancy identified in relation to the teleradiology preliminary report.    RADIATION DOSE  Automated tube current modulation, weight-based exposure dosing, and/or iterative reconstruction technique utilized to reach lowest reasonably achievable exposure rate.    DLP: 988 mGy*cm    Electronically signed by: Macario Rachel  Date:    08/08/2023  Time:    07:11      ASSESSMENT & PLAN:   Intracranial hemorrhage from metastatic brain lesion  Brain cancer/metastatic lesions precipitating above   History of AFib on Eliquis held   Essential hypertension, stable   Palliative care/comfort care    -patient awake and tolerating oral intake, in no distress.  Per palliative care note focus cystic get patient home, she likely would be appropriate to go home with hospice in a.m. Noted family has declined invasive workup including biopsies which is certainly appropriate  -continue current measures with a focus on  comfort  -as needed IV and p.o. antihypertensives ordered    DVT prophylaxis: scd  Code status: DNR comfort    If patient was admitted under observational status it is with my approval/permission.     At least 55 min was spent on this history and physical.  Time seen: 10PM 8/8/23  John Goff MD

## 2023-08-09 NOTE — PT/OT/SLP PROGRESS
Physical Therapy Treatment    Patient Name:  Kristel Mayes   MRN:  [id number]    Recommendations:     Discharge Recommendations: rehabilitation facility  Discharge Equipment Recommendations: none  Barriers to discharge:  Home equipment    Assessment:     Kristel Mayes is a 89 y.o. female admitted with a medical diagnosis of Hemorrhagic stroke.  She presents with the following impairments/functional limitations: weakness, impaired endurance, impaired self care skills, impaired functional mobility .     Pt going home with hospice, educated and trained pts daughter on how to safely perform transfers.    Rehab Prognosis: Good; patient would benefit from acute skilled PT services to address these deficits and reach maximum level of function.    Recent Surgery: * No surgery found *      Plan:     During this hospitalization, patient to be seen 6 x/week to address the identified rehab impairments via therapeutic activities and progress toward the following goals:    Plan of Care Expires:  09/08/23    Subjective     Chief Complaint: none  Patient/Family Comments/goals:   Pain/Comfort:         Objective:     Communicated with RN prior to session.  Patient found up in chair with   upon PT entry to room.     General Precautions: Standard, fall  Orthopedic Precautions: N/A  Braces: N/A  Respiratory Status: Room air  Skin Integrity: Visible skin intact      Functional Mobility:  Bed Mobility:     Sit to Supine: minimum assistance  Transfers:     Sit to Stand:  moderate assistance with hand-held assist  Bed to Chair: moderate assistance with  hand-held assist  using  Step Transfer    Education:  Patient and daughter/s provided with verbal education regarding POC.  Understanding was verbalized.     Patient left HOB elevated with all lines intact, call button in reach, and Daughter present..    GOALS:   Multidisciplinary Problems       Physical Therapy Goals          Problem: Physical Therapy    Goal Priority Disciplines Outcome Goal  Variances Interventions   Physical Therapy Goal     PT, PT/OT Ongoing, Progressing     Description: Goals to be met by: 23     Patient will increase functional independence with mobility by performin. Supine to sit with Modified [place]  2. Sit to stand transfer with Modified [place]  3. Gait  x 200 feet with Modified [place]olson using Rolling Walker.                          Time Tracking:     PT Received On: 23  PT Start Time: 1313     PT Stop Time: 1336  PT Total Time (min): 23 min     Billable Minutes: Therapeutic Activity 23    Treatment Type: Treatment  PT/PTA: PTA     Number of PTA visits since last PT visit: 1     2023

## 2023-08-09 NOTE — PT/OT/SLP PROGRESS
Occupational Therapy   Treatment    Name: Kristel Mayes  MRN: [id number]  Admitting Diagnosis:  Hemorrhagic stroke       Recommendations:     Discharge Recommendations:  (Pending D/C hospice)  Discharge Equipment Recommendations:  walker, rolling  Barriers to discharge:       Assessment:     Kristel Mayes is a 89 y.o. female with a medical diagnosis of Hemorrhagic stroke.  She presents with increased weakness . Performance deficits affecting function are weakness, impaired endurance, impaired functional mobility, impaired balance.     Rehab Prognosis:  Good; patient would benefit from acute skilled OT services to address these deficits and reach maximum level of function.       Plan:     Patient to be seen 5 x/week to address the above listed problems via self-care/home management, therapeutic activities, therapeutic exercises  Plan of Care Expires: 09/08/23  Plan of Care Reviewed with: patient    Subjective     Pain/Comfort:       Objective:     Communicated with: RN prior to session.  Patient found HOB elevated with   upon OT entry to room.    General Precautions: Standard, fall    Orthopedic Precautions:N/A  Braces: N/A  Respiratory Status: Room air  Vital Signs: Blood Pressure: 139/97  HR: 93     Occupational Performance:   (Bed Mobility-Mod A)  (Sitting balance-Min A progressing to CGA)  (Sit to stand- Mod A) L lateral lean noted.  BM noted in supported stance, requiring total A for pericare.   Pt. Requiring Mod A during stand step t/f from EOB to BS chair using RW for UE support with balance, increased L lateral lean noted.       Therapeutic Positioning    OT interventions performed during the course of today's session in an effort to prevent and/or reduce acquired pressure injuries:   Therapeutic positioning completed     [facility] 6 Click ADL:      Patient Education:  Patient provided with verbal education regarding fall prevention and safety awareness.  Additional teaching is warranted.      Patient left up in  chair with all lines intact and call button in reach    GOALS:   Multidisciplinary Problems       Occupational Therapy Goals          Problem: Occupational Therapy    Goal Priority Disciplines Outcome Interventions   Occupational Therapy Goal     OT, PT/OT Ongoing, Progressing    Description: Goals to be met by: 9/8/2023     Patient will increase functional independence with ADLs by performing:    LE Dressing with Set-up Assistance.  Grooming while standing at sink with Set-up Assistance.  Toileting from toilet with min Assistance for hygiene and clothing management.   Toilet transfer to toilet with Stand-by Assistance.                         Time Tracking:     OT Date of Treatment: 08/09/23  OT Start Time: 0954  OT Stop Time: 1019  OT Total Time (min): 25 min    Billable Minutes:Therapeutic Activity 2    OT/YOLI: YOLI     Number of YOLI visits since last OT visit: 1 8/9/2023

## 2023-08-09 NOTE — DISCHARGE SUMMARY
[facility] - 7th Floor ICU  HOSPITAL MEDICINE - DISCHARGE SUMMARY    Patient Name: Kristel Mayes  MRN: [id number]  Admission Date: 8/7/2023  Discharge Date: 08/09/2023  Hospital Length of Stay: 2 days  Discharge Provider: Jeffery Mcgee MD  Primary Care Provider: Susie, Primary Doctor      HOSPITAL COURSE   Patient is a 89-year-old female with a history of hypertension and AFib on Eliquis who [place] [facility] [place] weakness with CT showing acute right frontoparietal intracranial hemorrhage and midline shift, she was transferred from that facility to the ICU at this facility on 08/07/2023.  She received Kcentra.  She was seen by Neurosurgery and Neurology with recommendations for strict blood pressure control.  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  CT chest abdomen and pelvis showed no obvious primary.  Palliative Care met with patient and family members today and decision was made for hospice and comfort care measures.  She is being downgraded from the ICU continued care.     Saw her this morning and left-sided appear to be slightly weaker than the right.  Bilateral pupils equal and reactive.  I did discuss with her that there was concerned that she had a bleed in the brain and also a mass concerning for cancer.  Not sure how much of that she understood but she asked what could be done about that.  Discussed it would require a significant surgery and biopsy but did discuss with her that we have deferred doing that due to her age.  At this time will plan for discharge to home with hospice once we have accepting hospice company.        PHYSICAL EXAM     Most Recent Vital Signs:  Temp: 98.7 °F (37.1 °C) (08/09/23 0400)  Pulse: 63 (08/09/23 0400)  Resp: (!) 21 (08/09/23 0400)  BP: (!) 149/56 (08/09/23 0400)  SpO2: 98 % (08/09/23 0400)   GENERAL: In no acute distress, afebrile  HEENT:  CHEST: Clear to auscultation bilaterally  HEART: S1, S2,  no appreciable murmur  ABDOMEN: Soft, nontender, BS +  MSK: Warm, no lower extremity edema, no clubbing or cyanosis  NEUROLOGIC: Alert and oriented x4, left side slightly weaker than the right  INTEGUMENTARY:  PSYCHIATRY:          DISCHARGE DIAGNOSIS   Intracranial hemorrhage from metastatic brain lesion  Brain cancer/metastatic lesions precipitating above   History of AFib on Eliquis held   Essential hypertension, stable   Palliative care/comfort care        _____________________________________________________________________________      DISCHARGE MED REC     Current Discharge Medication List        START taking these medications    Details   levETIRAcetam (KEPPRA) 500 MG Tab Take 1 tablet (500 mg total) by mouth 2 (two) times daily.  Qty: 60 tablet, Refills: 11           CONTINUE these medications which have NOT CHANGED    Details   diltiaZEM (CARDIZEM CD) 360 MG 24 hr capsule Take 360 mg by mouth once daily.      levocetirizine (XYZAL) 5 MG tablet Take 5 mg by mouth every evening.      meclizine (ANTIVERT) 50 MG tablet Take 25 mg by mouth 3 (three) times daily as needed.      multivitamin (THERAGRAN) per tablet Take 1 tablet by mouth once daily.      pravastatin (PRAVACHOL) 40 MG tablet Take 40 mg by mouth once daily.                CONSULTS     Consults (From admission, onward)          Status Ordering Provider     Inpatient consult to Social Work/Case Management  Once        Provider:  (Not yet assigned)    Acknowledged FLY GARZA     Inpatient consult to Palliative Care  Once        Provider:  Fly Garza MD    Completed ZOILA WARD     Inpatient consult to Social Work/Case Management  Once        Provider:  (Not yet assigned)    Acknowledged ZOILA WARD     Inpatient consult to Registered Dietitian/Nutritionist  Once        Provider:  (Not yet assigned)    Completed CARISA FLYNN     IP consult to case management/social work  Once        Provider:  (Not yet assigned)    Acknowledged  CARISA FLYNN     Inpatient consult to Neurosurgery  Once        Provider:  Juanjo Moore MD    Completed JUAREZ REDDY     Inpatient consult to Vascular (Stroke) Neurology  Once        Provider:  Garcia Rowley MD    Completed JUAREZ REDDY              FOLLOW UP           DISCHARGE INSTRUCTIONS     Explained in detail to the patient about the discharge plan, medications, and follow-up visits. Pt understands and agrees with the treatment plan.  Discharged Condition: stable  Diet as tolerated  Activities as tolerated  Discharge to: Hospice/Home    TIME SPENT ON DISCHARGE   35 minutes        Jeffery Mcgee MD  Internal Medicine  Department of [facility] Medicine  [facility] - 7th Floor ICU      This document was created using electronic dictation services.  Please excuse any errors that may have been made.  Contact me if any questions regarding documentation to clarify verbiage.

## 2023-08-09 NOTE — PROGRESS NOTES
[facility] - 7th Floor ICU  Neurosurgery  Progress Note    Subjective:     Interval History: She is sitting up in bed, NAD. She currently denies HA and N/V. No other complaints this am.    History of Present Illness: Patient is a 89-year-old female with a history of hypertension and AFib on Eliquis who was admitted to [facility] [place] with acute left-sided weakness with CT showing acute right frontoparietal intracranial hemorrhage and midline shift, she was transferred from that facility to the ICU at this facility on 08/07/2023.  She received Kcentra.  She was seen by Neurosurgery and Neurology with recommendations for strict blood pressure control.  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  CT chest abdomen and pelvis showed no obvious primary.  Palliative Care met with patient and family members today and decision was made for hospice and comfort care measures.  She is being downgraded from the ICU continued care.        Post-Op Info:  * No surgery found *          Medications:  Continuous Infusions:  Scheduled Meds:   diltiaZEM  360 mg Oral Daily    levETIRAcetam  500 mg Oral BID    lorazepam  1 mg Intravenous Once    mupirocin   Nasal BID    pravastatin  40 mg Oral Daily     PRN Meds:cloNIDine, hydrALAZINE, labetaloL, sodium chloride 0.9%     Review of Systems  Objective:     Weight: 74.6 kg (164 lb 7.4 oz)  Body mass index is 29.13 kg/m².  Vital Signs (Most Recent):  Temp: 98 °F (36.7 °C) (08/09/23 0800)  Pulse: 91 (08/09/23 1100)  Resp: (!) 27 (08/09/23 1100)  BP: (!) 139/97 (08/09/23 0800)  SpO2: 97 % (08/09/23 0715) Vital Signs (24h Range):  Temp:  [97.6 °F (36.4 °C)-98.7 °F (37.1 °C)] 98 °F (36.7 °C)  Pulse:  [] 91  Resp:  [15-37] 27  SpO2:  [88 %-100 %] 97 %  BP: (139-168)/() 139/97     Date 08/09/23 0700 - 08/10/23 0659   Shift 0571-7442 8032-5864 8677-9053 24 Hour Total   INTAKE   P.O. 100   100   Shift Total(mL/kg) 100(1.3)   100(1.3)    OUTPUT   Shift Total(mL/kg)       Weight (kg) 74.6 74.6 74.6 74.6                       Female External Urinary Catheter 08/07/23 1600 (Active)   Skin no redness;no breakdown;perineum cleansed w/ soap and water 08/09/23 0800   Tolerance no signs/symptoms of discomfort 08/09/23 0800   Suction Continuous suction at 70 mmHg 08/09/23 0800   Date of last wick change 08/09/23 08/09/23 0800   Time of last wick change 2000 08/08/23 2000   Output (mL) 150 mL 08/08/23 1800       Neurosurgery Physical Exam  AFVSS  PERRL, EOMI  Alert, answers questions appropriately. Speech clear. Follows commands all ext. Hesitates to follow commands in the left ue but does with constant assurance.    Significant Labs:  Recent Labs   Lab 08/07/23  1402 08/08/23  0227 08/09/23  0341    142 138   K 3.7 3.5 3.7   CO2 30 27 24   BUN 12.0 10.2 9.3*   CREATININE 0.91 0.79 0.76   CALCIUM 10.8* 10.3* 10.3*     Recent Labs   Lab 08/07/23  1402 08/09/23  0341   WBC 4.75 4.17*   HGB 12.9 13.1   HCT 38.0 38.6    172     Recent Labs   Lab 08/07/23  1402   INR 1.1     Microbiology Results (last 7 days)       ** No results found for the last 168 hours. **            Significant Diagnostics:      Assessment/Plan:     Active Diagnoses:    Diagnosis Date Noted POA    PRINCIPAL PROBLEM:  Hemorrhagic stroke [I61.9] 08/07/2023 Yes    Nontraumatic cortical hemorrhage of right cerebral hemisphere [I61.1] 08/07/2023 Yes      Problems Resolved During this Admission:     She is stable with no c/o pain.  MRI reviewed; no plans for surgical intervention  Family has decided on palliative care. Likely home with hospice today.  We will sign off. Please call with any questions.    AKIKO Meyer  Neurosurgery  [facility] - 7th Floor ICU

## 2023-08-09 NOTE — PLAN OF CARE
Problem: Adult Inpatient Plan of Care  Goal: Plan of Care Review  Outcome: Ongoing, Progressing  Goal: Patient-Specific Goal (Individualized)  Outcome: Ongoing, Progressing  Goal: Absence of Hospital-Acquired Illness or Injury  Outcome: Ongoing, Progressing  Goal: Optimal Comfort and Wellbeing  Outcome: Ongoing, Progressing  Goal: Readiness for Transition of Care  Outcome: Ongoing, Progressing     Problem: Adjustment to Illness (Stroke, Hemorrhagic)  Goal: Optimal Coping  Outcome: Ongoing, Progressing     Problem: Bowel Elimination Impaired (Stroke, Hemorrhagic)  Goal: Effective Bowel Elimination  Outcome: Ongoing, Progressing     Problem: Cerebral Tissue Perfusion (Stroke, Hemorrhagic)  Goal: Optimal Cerebral Tissue Perfusion  Outcome: Ongoing, Progressing     Problem: Cognitive Impairment (Stroke, Hemorrhagic)  Goal: Optimal Cognitive Function  Outcome: Ongoing, Progressing     Problem: Communication Impairment (Stroke, Hemorrhagic)  Goal: Effective Communication Skills  Outcome: Ongoing, Progressing     Problem: Functional Ability Impaired (Stroke, Hemorrhagic)  Goal: Optimal Functional Ability  Outcome: Ongoing, Progressing     Problem: Pain (Stroke, Hemorrhagic)  Goal: Acceptable Pain Control  Outcome: Ongoing, Progressing     Problem: Respiratory Compromise (Stroke, Hemorrhagic)  Goal: Effective Oxygenation and Ventilation  Outcome: Ongoing, Progressing     Problem: Sensorimotor Impairment (Stroke, Hemorrhagic)  Goal: Improved Sensorimotor Function  Outcome: Ongoing, Progressing     Problem: Swallowing Impairment (Stroke, Hemorrhagic)  Goal: Optimal Eating and Swallowing Without Aspiration  Outcome: Ongoing, Progressing     Problem: Urinary Elimination Impaired (Stroke, Hemorrhagic)  Goal: Effective Urinary Elimination  Outcome: Ongoing, Progressing     Problem: Fall Injury Risk  Goal: Absence of Fall and Fall-Related Injury  Outcome: Ongoing, Progressing     Problem: Skin Injury Risk Increased  Goal: Skin  Health and Integrity  Outcome: Ongoing, Progressing     Problem: Coping Ineffective  Goal: Effective Coping  Outcome: Ongoing, Progressing

## 2023-08-09 NOTE — NURSING
Nurses Note -- 4 Eyes      8/9/2023   4:16 PM      Skin assessed during: Daily Assessment      [x] No Altered Skin Integrity Present    []Prevention Measures Documented      [] Yes- Altered Skin Integrity Present or Discovered   [] LDA Added if Not in Epic (Describe Wound)   [] New Altered Skin Integrity was Present on Admit and Documented in LDA   [] Wound Image Taken    Wound Care Consulted? No    Attending Nurse:  Ankit Cheek RN    Second RN/Staff Member:  Saranya Cameron RN

## 2023-08-09 NOTE — CONSULTS
"ConsultsPatient Name: Kristel Mayes   MRN: [id number]   Admission Date: 8/7/2023   Hospital Length of Stay: 2   Attending Provider: Jeffery Mcgee MD   Consulting Provider: Harlan Estrada M.D.  Reason for Consult: Goals of Care  Primary Care Physician: Susie, Primary Doctor     Principal Problem: Hemorrhagic stroke     Patient information was obtained from patient, relative(s), and ER records.      Final diagnoses:  [I61.9] Intracerebral hemorrhage  [I61.2] Nontraumatic hemorrhage of right cerebral hemisphere (Primary)  [R90.0] Intracranial mass  [I10] Hypertension, unspecified type     Assessment/Plan:     I met with the patient and her 2 daughters at bedside. I offered to share information about the findings from her work-up. The patient stated, "as long as it wasn't too bad." I offered that we share only findings with her family. Her daughter asked her again if she would like to know basic information. She said yes. I told her about the brain masses. I told her that surgical intervention may prove to do more harm than benefit. Therefore the doctors and her family are against surgery or further testing. The patient agreed. I explained about setting up hospice care to assist with her symptom needs. She was in favor of this.    I completed the LAPOST with her family. A. DNR, B. Comfort focused-treatments. C. No artificial nutrition by tube.     Her daughter, Patricia had concerns about not offering IVF. I explained that IVF could be offered in certain situations if the treatment is felt to be more beneficial than harmful. I noted that a bag of fluids may be given in a circumstance in which she is temporarily dehydrated due to a treatable infection or decreased po intake that is temporary. I explained that frequent or continuous IVF are not sustainable or helpful and at times may be harmful. The family agreed    I reviewed the patient's current clinical status with the nurse. I reviewed clinical documentation, labs and " imaging.     Symptom management review: She denies pain, dyspnea, nausea, constipation, anxiety or other symptoms.        Disposition: Home with hospice care     History of Present Illness:     89 y/o F h/o Atrial fib on Eliquis. Newly diagnosed brain masses suspicious for malignancy. We were consulted to review goals of care with family.      Active Ambulatory Problems     Diagnosis Date Noted    No Active Ambulatory Problems     Resolved Ambulatory Problems     Diagnosis Date Noted    No Resolved Ambulatory Problems     No Additional Past Medical History        No past surgical history on file.     Review of patient's allergies indicates:  No Known Allergies       Current Facility-Administered Medications:     cloNIDine tablet 0.1 mg, 0.1 mg, Oral, Q6H PRN, John Goff MD    diltiaZEM 24 hr capsule 360 mg, 360 mg, Oral, Daily, John Goff MD, 360 mg at 08/09/23 0858    hydrALAZINE injection 10 mg, 10 mg, Intravenous, Q4H PRN, Shannan Ac DO, 10 mg at 08/08/23 1236    labetaloL injection 10 mg, 10 mg, Intravenous, Q6H PRN, Shannan Ac DO    levETIRAcetam tablet 500 mg, 500 mg, Oral, BID, Day, AKIKO Caldwell, 500 mg at 08/09/23 0858    LORazepam injection 1 mg, 1 mg, Intravenous, Once, Ninoska Winston, CORINNE    mupirocin 2 % ointment, , Nasal, BID, Xavier Johns MD, Given at 08/09/23 0858    pravastatin tablet 40 mg, 40 mg, Oral, Daily, John Goff MD, 40 mg at 08/09/23 0858    sodium chloride 0.9% flush 10 mL, 10 mL, Intravenous, PRN, Kalani Torres NP     cloNIDine, hydrALAZINE, labetaloL, sodium chloride 0.9%     No family history on file.     Review of Systems   HENT:  Negative for sore throat and trouble swallowing.    Respiratory:  Negative for shortness of breath.    Cardiovascular:  Negative for chest pain.   Gastrointestinal:  Negative for abdominal pain and constipation.   Musculoskeletal:  Negative for arthralgias and back pain.            Objective:   BP (!)  "139/97   Pulse 77   Temp 98 °F (36.7 °C)   Resp (!) 25   Ht 5' 3" (1.6 m)   Wt 74.6 kg (164 lb 7.4 oz)   SpO2 97%   BMI 29.13 kg/m²      Physical Exam  Vitals reviewed.   Constitutional:       General: She is not in acute distress.     Appearance: She is not ill-appearing or toxic-appearing.   HENT:      Head: Normocephalic.      Right Ear: External ear normal.      Left Ear: External ear normal.      Nose: Nose normal.      Mouth/Throat:      Mouth: Mucous membranes are moist.      Pharynx: Oropharynx is clear.   Eyes:      Extraocular Movements: Extraocular movements intact.      Conjunctiva/sclera: Conjunctivae normal.      Pupils: Pupils are equal, round, and reactive to light.   Cardiovascular:      Rate and Rhythm: Normal rate and regular rhythm.      Pulses: Normal pulses.      Heart sounds: Normal heart sounds.   Pulmonary:      Effort: Pulmonary effort is normal.      Breath sounds: Normal breath sounds.   Abdominal:      General: Abdomen is flat. Bowel sounds are normal.      Palpations: Abdomen is soft.   Musculoskeletal:      Right lower leg: No edema.      Left lower leg: No edema.   Skin:     General: Skin is warm.   Neurological:      Mental Status: She is alert.      Comments: She appeared to fumble with following of commands by squeezing both hands, she hesitated for a while with the left hand. She was alert and oriented to self and place, not situation or time.   Psychiatric:         Mood and Affect: Mood normal.         Behavior: Behavior normal.         Thought Content: Thought content normal.            Review of Symptoms  Review of Symptoms      Symptom Assessment (ESAS 0-10 Scale)  Pain:  0  Dyspnea:  0  Anxiety:  0  Nausea:  0  Depression:  0  Anorexia:  0  Fatigue:  0  Insomnia:  0  Restlessness:  0  Agitation:  0     CAM / Delirium:  Negative  Constipation:  Negative  Diarrhea:  Negative    Constipation:  No constipation    Bowel Management Plan (BMP):  Yes      Pain Assessment:  OME " in 24 hours:  0  Location(s):      Modified Sheila Scale:  0    Performance Status:  40    Living Arrangements:  Lives in home and Lives with family    Psychosocial/Cultural:   See Palliative Psychosocial Note: No  Pt lives with daughter and has 8 children. She is never alone  **Primary  to Follow**  Palliative Care  Consult: No    Spiritual:  F - Roxie and Belief:  Moravian  A - Address in Care:  She has 2 daughters that are pastors     Time-Based Charting:  Yes    Total Time Spent: 0 minutes      Advance Care Planning   Advance Directives:   Living Will: No    [place]: Yes    Do Not Resuscitate Status: Yes    Medical Power of : Yes    Agent's Name:  Patricia Mayes Cristino, hal and HCPOA   Agent's Contact Number:  [phone]    Decision Making:  Patient answered questions and Family answered questions  Goals of Care: What is most important right now is to focus on spending time at home, avoiding the hospital, symptom/pain control, quality of life, even if it means sacrificing a little time, improvement in condition but with limits to invasive therapies, comfort and QOL . Accordingly, we have decided that the best plan to meet the patient's goals includes enrolling in hospice care.            Caregiver burden formerly assessed: Yes        > 50% of 28 min of encounter was spent in chart review, face to face discussion of goals of care, symptom assessment, coordination of care and emotional support.     Harlan Estrada M.D.  Palliative Medicine  [facility] - Observation Unit

## 2023-08-09 NOTE — NURSING
Nurses Note -- 4 Eyes      8/8/2023   7:14 PM      Skin assessed during: Daily Assessment      [x] No Altered Skin Integrity Present    [x]Prevention Measures Documented      [] Yes- Altered Skin Integrity Present or Discovered   [] LDA Added if Not in Epic (Describe Wound)   [] New Altered Skin Integrity was Present on Admit and Documented in LDA   [] Wound Image Taken    Wound Care Consulted? No    Attending Nurse:  Ana Rushing RN/Staff Member:   Salud Ibrahim RN